# Patient Record
Sex: MALE | Race: WHITE | Employment: OTHER | URBAN - NONMETROPOLITAN AREA
[De-identification: names, ages, dates, MRNs, and addresses within clinical notes are randomized per-mention and may not be internally consistent; named-entity substitution may affect disease eponyms.]

---

## 2017-03-01 PROBLEM — S06.5XAA SUBDURAL HEMATOMA: Status: ACTIVE | Noted: 2017-03-01

## 2017-03-01 PROBLEM — I60.9 SUBARACHNOID HEMORRHAGE (HCC): Status: ACTIVE | Noted: 2017-03-01

## 2017-11-27 PROBLEM — R10.11 ABDOMINAL PAIN, RIGHT UPPER QUADRANT: Status: ACTIVE | Noted: 2017-11-27

## 2017-11-27 PROBLEM — R07.1 CHEST PAIN ON BREATHING: Status: ACTIVE | Noted: 2017-11-27

## 2018-03-20 ENCOUNTER — OFFICE VISIT (OUTPATIENT)
Dept: ORTHOPEDIC SURGERY | Age: 63
End: 2018-03-20
Payer: MEDICARE

## 2018-03-20 VITALS
TEMPERATURE: 97.8 F | WEIGHT: 250 LBS | BODY MASS INDEX: 33.86 KG/M2 | SYSTOLIC BLOOD PRESSURE: 131 MMHG | HEIGHT: 72 IN | DIASTOLIC BLOOD PRESSURE: 86 MMHG | HEART RATE: 64 BPM

## 2018-03-20 DIAGNOSIS — M70.61 TROCHANTERIC BURSITIS OF RIGHT HIP: ICD-10-CM

## 2018-03-20 DIAGNOSIS — M25.551 RIGHT HIP PAIN: Primary | ICD-10-CM

## 2018-03-20 PROCEDURE — 99213 OFFICE O/P EST LOW 20 MIN: CPT | Performed by: ORTHOPAEDIC SURGERY

## 2018-03-20 RX ORDER — LEVOTHYROXINE SODIUM 175 UG/1
TABLET ORAL
Refills: 0 | COMMUNITY
Start: 2018-03-07 | End: 2019-07-08

## 2018-03-20 RX ORDER — CITALOPRAM 20 MG/1
TABLET ORAL
Refills: 0 | COMMUNITY
Start: 2018-03-18 | End: 2018-03-20

## 2018-05-15 ENCOUNTER — OFFICE VISIT (OUTPATIENT)
Dept: ORTHOPEDIC SURGERY | Age: 63
End: 2018-05-15
Payer: MEDICARE

## 2018-05-15 VITALS
WEIGHT: 250 LBS | BODY MASS INDEX: 33.86 KG/M2 | SYSTOLIC BLOOD PRESSURE: 131 MMHG | HEIGHT: 72 IN | DIASTOLIC BLOOD PRESSURE: 77 MMHG | HEART RATE: 71 BPM | TEMPERATURE: 97.5 F

## 2018-05-15 DIAGNOSIS — M25.551 RIGHT HIP PAIN: ICD-10-CM

## 2018-05-15 DIAGNOSIS — M70.61 TROCHANTERIC BURSITIS OF RIGHT HIP: Primary | ICD-10-CM

## 2018-05-15 PROCEDURE — 20610 DRAIN/INJ JOINT/BURSA W/O US: CPT | Performed by: ORTHOPAEDIC SURGERY

## 2018-05-15 PROCEDURE — 99213 OFFICE O/P EST LOW 20 MIN: CPT | Performed by: ORTHOPAEDIC SURGERY

## 2018-05-15 RX ORDER — TAMSULOSIN HYDROCHLORIDE 0.4 MG/1
0.4 CAPSULE ORAL NIGHTLY
Refills: 1 | COMMUNITY
Start: 2018-04-12

## 2018-05-15 RX ORDER — TRIAMCINOLONE ACETONIDE 40 MG/ML
40 INJECTION, SUSPENSION INTRA-ARTICULAR; INTRAMUSCULAR ONCE
Status: COMPLETED | OUTPATIENT
Start: 2018-05-15 | End: 2018-05-15

## 2018-05-15 RX ORDER — LIDOCAINE HYDROCHLORIDE 10 MG/ML
2 INJECTION, SOLUTION INFILTRATION; PERINEURAL ONCE
Status: COMPLETED | OUTPATIENT
Start: 2018-05-15 | End: 2018-05-15

## 2018-05-15 RX ORDER — BUPIVACAINE HYDROCHLORIDE 2.5 MG/ML
2 INJECTION, SOLUTION INFILTRATION; PERINEURAL ONCE
Status: COMPLETED | OUTPATIENT
Start: 2018-05-15 | End: 2018-05-15

## 2018-05-15 RX ORDER — CALCIUM CARBONATE 500(1250)
TABLET ORAL
Refills: 0 | COMMUNITY
Start: 2018-05-04 | End: 2020-01-08

## 2018-05-15 RX ADMIN — LIDOCAINE HYDROCHLORIDE 2 ML: 10 INJECTION, SOLUTION INFILTRATION; PERINEURAL at 10:21

## 2018-05-15 RX ADMIN — TRIAMCINOLONE ACETONIDE 40 MG: 40 INJECTION, SUSPENSION INTRA-ARTICULAR; INTRAMUSCULAR at 10:22

## 2018-05-15 RX ADMIN — BUPIVACAINE HYDROCHLORIDE 5 MG: 2.5 INJECTION, SOLUTION INFILTRATION; PERINEURAL at 10:21

## 2018-07-31 ENCOUNTER — OFFICE VISIT (OUTPATIENT)
Dept: ORTHOPEDIC SURGERY | Age: 63
End: 2018-07-31
Payer: MEDICARE

## 2018-07-31 VITALS
HEIGHT: 72 IN | BODY MASS INDEX: 33.86 KG/M2 | SYSTOLIC BLOOD PRESSURE: 137 MMHG | DIASTOLIC BLOOD PRESSURE: 84 MMHG | HEART RATE: 78 BPM | WEIGHT: 250 LBS

## 2018-07-31 DIAGNOSIS — M25.562 LEFT KNEE PAIN, UNSPECIFIED CHRONICITY: Primary | ICD-10-CM

## 2018-07-31 PROCEDURE — 99213 OFFICE O/P EST LOW 20 MIN: CPT | Performed by: ORTHOPAEDIC SURGERY

## 2018-07-31 PROCEDURE — 20610 DRAIN/INJ JOINT/BURSA W/O US: CPT | Performed by: ORTHOPAEDIC SURGERY

## 2018-07-31 RX ORDER — LIDOCAINE HYDROCHLORIDE 10 MG/ML
2 INJECTION, SOLUTION INFILTRATION; PERINEURAL ONCE
Status: COMPLETED | OUTPATIENT
Start: 2018-07-31 | End: 2018-07-31

## 2018-07-31 RX ORDER — TRIAMCINOLONE ACETONIDE 40 MG/ML
40 INJECTION, SUSPENSION INTRA-ARTICULAR; INTRAMUSCULAR ONCE
Status: COMPLETED | OUTPATIENT
Start: 2018-07-31 | End: 2018-07-31

## 2018-07-31 RX ORDER — BUPIVACAINE HYDROCHLORIDE 2.5 MG/ML
2 INJECTION, SOLUTION INFILTRATION; PERINEURAL ONCE
Status: COMPLETED | OUTPATIENT
Start: 2018-07-31 | End: 2018-07-31

## 2018-07-31 RX ADMIN — TRIAMCINOLONE ACETONIDE 40 MG: 40 INJECTION, SUSPENSION INTRA-ARTICULAR; INTRAMUSCULAR at 16:02

## 2018-07-31 RX ADMIN — BUPIVACAINE HYDROCHLORIDE 5 MG: 2.5 INJECTION, SOLUTION INFILTRATION; PERINEURAL at 16:01

## 2018-07-31 RX ADMIN — LIDOCAINE HYDROCHLORIDE 2 ML: 10 INJECTION, SOLUTION INFILTRATION; PERINEURAL at 16:02

## 2018-07-31 NOTE — PROGRESS NOTES
Assisted Dr. Dk Kang with injection of 2 cc marcaine/2 cc lidocaine/1 cc kenalog in L knee. Patient tolerated procedure well. Verbal instructions were given.

## 2018-07-31 NOTE — PROGRESS NOTES
Established Patient: New Knee Patient     Referring Provider:   No referring provider defined for this encounter. CHIEF COMPLAINT:   Chief Complaint   Patient presents with    Knee Pain     Left knee pain        HPI:    Jeffy Stewart is a 66y.o. year old male who is seen today  for evaluation of left knee pain. He is well known to us from previous assessment of his hip bursitis. This has responded to injections. His left knee has started bothering him as he is been doing more therapy including standing and doing some walking with weights. The pain is mainly medial.  He has history of meniscectomy open many years ago. He has had no treatment. PAST MEDICAL HISTORY  Past Medical History:   Diagnosis Date    Bleeding tendency ([address]     Hypertension     Left knee pain 7/31/2018    Pain of right side of body     RADIATES TO RT LEG    Thyroid disease     Tingling     SOMRTIMES RT HAND & LEG    Traumatic brain injury ([facility] [address] 2008    GAS WELL EXPLOSION WITH  TRAUMATIC BRAIN INJURY AND RIGHTHEMIPARESIS     Urination decrease     Weakness of right side of body        PAST SURGICAL HISTORY  Past Surgical History:   Procedure Laterality Date    BACLOFEN PUMP IMPLANTATION  07/2015    BRAIN SURGERY       SHUNT JULY 2008    FRACTURE SURGERY      RIGHT ARM WITH WINTER PLACEMENT    HIP SURGERY      HETEROTOPIC OSSIFICATION RIGHT HIP    JOINT REPLACEMENT      RIGHT KNEE ARTHROSCOPIC    KIDNEY STONE SURGERY      PARTIAL NEPHRECTOMY      VENTRICULOPERITONEAL SHUNT  2008         FAMILY HISTORY   Family History   Problem Relation Age of Onset    No Known Problems Mother     Other Father        SOCIAL HISTORY  Social History     Social History    Marital status:      Spouse name: N/A    Number of children: N/A    Years of education: N/A     Occupational History    Not on file.      Social History Main Topics    Smoking status: Never Smoker    Smokeless tobacco: Never Used    Alcohol use No

## 2018-09-18 ENCOUNTER — OFFICE VISIT (OUTPATIENT)
Dept: ORTHOPEDIC SURGERY | Age: 63
End: 2018-09-18
Payer: MEDICARE

## 2018-09-18 VITALS
HEIGHT: 72 IN | WEIGHT: 250 LBS | DIASTOLIC BLOOD PRESSURE: 84 MMHG | BODY MASS INDEX: 33.86 KG/M2 | HEART RATE: 75 BPM | SYSTOLIC BLOOD PRESSURE: 132 MMHG

## 2018-09-18 DIAGNOSIS — M17.12 OSTEOARTHRITIS OF LEFT KNEE, UNSPECIFIED OSTEOARTHRITIS TYPE: ICD-10-CM

## 2018-09-18 DIAGNOSIS — M25.562 LEFT KNEE PAIN, UNSPECIFIED CHRONICITY: Primary | ICD-10-CM

## 2018-09-18 PROCEDURE — 99213 OFFICE O/P EST LOW 20 MIN: CPT | Performed by: ORTHOPAEDIC SURGERY

## 2019-01-08 LAB
CHOLESTEROL, TOTAL: 152 MG/DL
CHOLESTEROL/HDL RATIO: 7.2
HDLC SERPL-MCNC: 21 MG/DL (ref 35–70)
LDL CHOLESTEROL CALCULATED: 95 MG/DL (ref 0–160)
TRIGL SERPL-MCNC: 240 MG/DL
VLDLC SERPL CALC-MCNC: ABNORMAL MG/DL

## 2019-05-08 RX ORDER — TRIAMCINOLONE ACETONIDE 1 MG/G
CREAM TOPICAL 2 TIMES DAILY
COMMUNITY
End: 2019-07-08 | Stop reason: SDUPTHER

## 2019-05-09 ENCOUNTER — OFFICE VISIT (OUTPATIENT)
Dept: FAMILY MEDICINE CLINIC | Age: 64
End: 2019-05-09
Payer: COMMERCIAL

## 2019-05-09 VITALS — RESPIRATION RATE: 18 BRPM | BODY MASS INDEX: 33.91 KG/M2 | HEIGHT: 72 IN

## 2019-05-09 DIAGNOSIS — S06.2X9A: ICD-10-CM

## 2019-05-09 DIAGNOSIS — G81.91 RIGHT HEMIPARESIS (HCC): Primary | ICD-10-CM

## 2019-05-09 DIAGNOSIS — G91.3 POST-TRAUMATIC HYDROCEPHALUS (HCC): ICD-10-CM

## 2019-05-09 DIAGNOSIS — R60.0 LOCALIZED EDEMA: ICD-10-CM

## 2019-05-09 PROCEDURE — 99213 OFFICE O/P EST LOW 20 MIN: CPT | Performed by: FAMILY MEDICINE

## 2019-05-09 ASSESSMENT — PATIENT HEALTH QUESTIONNAIRE - PHQ9
SUM OF ALL RESPONSES TO PHQ9 QUESTIONS 1 & 2: 0
2. FEELING DOWN, DEPRESSED OR HOPELESS: 0
1. LITTLE INTEREST OR PLEASURE IN DOING THINGS: 0
SUM OF ALL RESPONSES TO PHQ QUESTIONS 1-9: 0
SUM OF ALL RESPONSES TO PHQ QUESTIONS 1-9: 0

## 2019-05-09 ASSESSMENT — ENCOUNTER SYMPTOMS
EYES NEGATIVE: 1
ALLERGIC/IMMUNOLOGIC NEGATIVE: 1
GASTROINTESTINAL NEGATIVE: 1
RESPIRATORY NEGATIVE: 1

## 2019-05-09 NOTE — PROGRESS NOTES
19  [facility] 1955 Sex: male  Age: 60 y.o. Chief Complaint   Patient presents with    Other     f/u on rt hemiplegia --       Follow up on [facility] claim. Pt continues to benefit from PT/OT/ speech therapy. Pt continues to have slurred speech. He can walk short distances with a walker/cane. Review of Systems   Constitutional: Negative. HENT: Negative. Eyes: Negative. Respiratory: Negative. Cardiovascular: Negative. Gastrointestinal: Negative. Endocrine: Negative. Genitourinary: Negative. Musculoskeletal: Negative. Pt using motorized wheelchair    Skin: Negative. Allergic/Immunologic: Negative. Neurological: Positive for speech difficulty. Hematological: Negative. Psychiatric/Behavioral: Negative. Current Outpatient Medications:     triamcinolone (KENALOG) 0.1 % cream, Apply topically 2 times daily, Disp: , Rfl:     MAGNESIUM OXIDE PO, Take by mouth, Disp: , Rfl:     tamsulosin (FLOMAX) 0.4 MG capsule, take 1 capsule by mouth once daily, Disp: , Rfl: 1    calcium elemental (OSCAL) 500 MG TABS tablet, , Disp: , Rfl: 0    levothyroxine (SYNTHROID) 175 MCG tablet, take 1 tablet by mouth once daily, Disp: , Rfl: 0    allopurinol (ZYLOPRIM) 100 MG tablet, , Disp: , Rfl: 0    Calcium Carbonate-Vitamin D (OYSTER SHELL CALCIUM/D) 500-200 MG-UNIT TABS, Take 1 tablet by mouth daily, Disp: , Rfl:     Misc Natural Products (OSTEO BI-FLEX JOINT SHIELD PO), Take by mouth, Disp: , Rfl:     potassium citrate (UROCIT-K) 10 MEQ (1080 MG) extended release tablet, , Disp: , Rfl: 1    BACLOFEN, PAIN PUMP REFILL CHARGE,, , Disp: , Rfl:     gabapentin (NEURONTIN) 100 MG capsule, Take 100 mg by mouth 2 times daily, Disp: , Rfl:     amLODIPine (NORVASC) 5 MG tablet, Take 5 mg by mouth daily, Disp: , Rfl:     citalopram (CELEXA) 10 MG tablet, Take 10 mg by mouth daily. , Disp: , Rfl:     aspirin 81 MG tablet, Take 81 mg by mouth daily.   , Disp: , Rfl:     therapeutic multivitamin-minerals (THERAGRAN-M) tablet, Take 1 tablet by mouth daily , Disp: , Rfl:   Allergies   Allergen Reactions    Septra [Bactrim]      SEPTRA DS REDNESS HIVES AND HARD TIME BREATHING    Sulfamethoxazole-Trimethoprim Rash       Past Medical History:   Diagnosis Date    Bleeding tendency ([facility] [address]     Diabetes mellitus ([facility] [address]     DVT (deep venous thrombosis) (HCC)     subclavian vein    Gout     Hydrocephalus     Hypertension     Hypothyroidism     Intracranial bleed ([facility] [address]     Left knee pain 7/31/2018    Pain of right side of body     RADIATES TO RT LEG    Tingling     SOMRTIMES RT HAND & LEG    Traumatic brain injury ([facility] [address] 2008    GAS WELL EXPLOSION WITH  TRAUMATIC BRAIN INJURY AND RIGHTHEMIPARESIS     Urination decrease     Visual disturbances     Weakness of right side of body      Past Surgical History:   Procedure Laterality Date    BACLOFEN PUMP IMPLANTATION  07/2015    BRAIN SURGERY       SHUNT JULY 2008    FRACTURE SURGERY      RIGHT ARM WITH WINTER PLACEMENT    HIP SURGERY      HETEROTOPIC OSSIFICATION RIGHT HIP    JOINT REPLACEMENT      RIGHT KNEE ARTHROSCOPIC    KIDNEY STONE SURGERY      PARTIAL NEPHRECTOMY      VENTRICULOPERITONEAL SHUNT  2008     Family History   Problem Relation Age of Onset    COPD Mother     Diabetes Mother     Other Father         Black lung    Cancer Brother         Lung, Stomach     Social History     Socioeconomic History    Marital status:      Spouse name: Not on file    Number of children: Not on file    Years of education: Not on file    Highest education level: Not on file   Occupational History    Not on file   Social Needs    Financial resource strain: Not on file    Food insecurity:     Worry: Not on file     Inability: Not on file    Transportation needs:     Medical: Not on file     Non-medical: Not on file   Tobacco Use    Smoking status: Never Smoker    Smokeless tobacco: Never Used   Substance and and Plan:  Romi Byrnes was seen today for other.     Diagnoses and all orders for this visit:    Right hemiparesis ([facility] [address]  Comments:  refilled pt/ot/speech therapy today   Orders:  -     Cancel: External Referral To Speech Therapy  -     Cancel: External Referral To Occupational Therapy  -     Cancel: External Referral To Physical Therapy  -     Cancel: External Referral To Physical Therapy  -     Cancel: External Referral To Occupational Therapy  -     Cancel: External Referral To Speech Therapy  -     External Referral To Physical Therapy  -     Amb External Referral To Speech Therapy  -     Amb External Referral To Occupational Therapy    Post-traumatic hydrocephalus  -     Cancel: External Referral To Speech Therapy  -     Cancel: External Referral To Occupational Therapy  -     Cancel: External Referral To Physical Therapy  -     Cancel: External Referral To Physical Therapy  -     Cancel: External Referral To Occupational Therapy  -     Cancel: External Referral To Speech Therapy  -     External Referral To Physical Therapy  -     Amb External Referral To Speech Therapy  -     Amb External Referral To Occupational Therapy    Cortex contusion without open intracranial wound and with prolonged loss of consciousness (more than 24 hours) without return to pre-existing conscious level, initial encounter ([facility] [address]  -     Cancel: External Referral To Speech Therapy  -     Cancel: External Referral To Occupational Therapy  -     Cancel: External Referral To Physical Therapy  -     Cancel: External Referral To Physical Therapy  -     Cancel: External Referral To Occupational Therapy  -     Cancel: External Referral To Speech Therapy  -     External Referral To Physical Therapy  -     Amb External Referral To Speech Therapy  -     Amb External Referral To Occupational Therapy    Localized edema  -     Cancel: External Referral To Speech Therapy  -     Cancel: External Referral To Occupational Therapy  -     Cancel: External Referral To Physical Therapy  -     Cancel: External Referral To Physical Therapy  -     Cancel: External Referral To Occupational Therapy  -     Cancel: External Referral To Speech Therapy  -     External Referral To Physical Therapy  -     Amb External Referral To Speech Therapy  -     Amb External Referral To Occupational Therapy        Return in about 3 months (around 8/9/2019). Seen By:  Steffi Parra DO  This note has been transcribed by Derik Durbin under the direction of Dr. Zari Koenig. Dr. Pili Salas has reviewed this note for accuracy. I, Dr. Pili Salas, personally performed the services described in this documentation as scribed by Derik Durbin in my presence, and it is both accurate and complete.

## 2019-05-10 ENCOUNTER — TELEPHONE (OUTPATIENT)
Dept: FAMILY MEDICINE CLINIC | Age: 64
End: 2019-05-10

## 2019-05-23 ENCOUNTER — TELEPHONE (OUTPATIENT)
Dept: FAMILY MEDICINE CLINIC | Age: 64
End: 2019-05-23

## 2019-07-08 ENCOUNTER — OFFICE VISIT (OUTPATIENT)
Dept: FAMILY MEDICINE CLINIC | Age: 64
End: 2019-07-08
Payer: MEDICARE

## 2019-07-08 ENCOUNTER — HOSPITAL ENCOUNTER (OUTPATIENT)
Age: 64
Discharge: HOME | End: 2019-07-10
Payer: MEDICARE

## 2019-07-08 VITALS — HEART RATE: 67 BPM | RESPIRATION RATE: 16 BRPM | SYSTOLIC BLOOD PRESSURE: 116 MMHG | DIASTOLIC BLOOD PRESSURE: 66 MMHG

## 2019-07-08 DIAGNOSIS — E03.9 HYPOTHYROIDISM, UNSPECIFIED TYPE: ICD-10-CM

## 2019-07-08 DIAGNOSIS — F32.A DEPRESSION, UNSPECIFIED DEPRESSION TYPE: ICD-10-CM

## 2019-07-08 DIAGNOSIS — R94.5 ABNORMAL LIVER FUNCTION: ICD-10-CM

## 2019-07-08 DIAGNOSIS — I10 ESSENTIAL HYPERTENSION: Primary | ICD-10-CM

## 2019-07-08 DIAGNOSIS — M10.9 GOUT, UNSPECIFIED CAUSE, UNSPECIFIED CHRONICITY, UNSPECIFIED SITE: ICD-10-CM

## 2019-07-08 LAB
T4 FREE: 1.84 NG/DL (ref 0.93–1.7)
TSH SERPL DL<=0.05 MIU/L-ACNC: 0.63 UIU/ML (ref 0.27–4.2)
URIC ACID, SERUM: 7 MG/DL (ref 3.4–7)

## 2019-07-08 PROCEDURE — 84443 ASSAY THYROID STIM HORMONE: CPT

## 2019-07-08 PROCEDURE — 99214 OFFICE O/P EST MOD 30 MIN: CPT | Performed by: FAMILY MEDICINE

## 2019-07-08 PROCEDURE — 36415 COLL VENOUS BLD VENIPUNCTURE: CPT

## 2019-07-08 PROCEDURE — 93000 ELECTROCARDIOGRAM COMPLETE: CPT | Performed by: FAMILY MEDICINE

## 2019-07-08 PROCEDURE — 84550 ASSAY OF BLOOD/URIC ACID: CPT

## 2019-07-08 PROCEDURE — 84439 ASSAY OF FREE THYROXINE: CPT

## 2019-07-08 RX ORDER — TRIAMCINOLONE ACETONIDE 1 MG/G
CREAM TOPICAL 2 TIMES DAILY
Qty: 80 G | Refills: 3 | Status: SHIPPED | OUTPATIENT
Start: 2019-07-08 | End: 2020-01-08 | Stop reason: SDUPTHER

## 2019-07-08 RX ORDER — LEVOTHYROXINE SODIUM 0.15 MG/1
TABLET ORAL
Refills: 0 | COMMUNITY
Start: 2019-06-26 | End: 2019-07-08 | Stop reason: SDUPTHER

## 2019-07-08 RX ORDER — CITALOPRAM 10 MG/1
10 TABLET ORAL DAILY
Qty: 90 TABLET | Refills: 1 | Status: SHIPPED | OUTPATIENT
Start: 2019-07-08 | End: 2019-10-21

## 2019-07-08 RX ORDER — AMLODIPINE BESYLATE 5 MG/1
5 TABLET ORAL DAILY
Qty: 90 TABLET | Refills: 1 | Status: SHIPPED | OUTPATIENT
Start: 2019-07-08 | End: 2020-01-08 | Stop reason: SDUPTHER

## 2019-07-08 RX ORDER — LEVOTHYROXINE SODIUM 0.15 MG/1
150 TABLET ORAL DAILY
Qty: 90 TABLET | Refills: 1 | Status: SHIPPED | OUTPATIENT
Start: 2019-07-08 | End: 2020-01-08 | Stop reason: SDUPTHER

## 2019-07-08 ASSESSMENT — ENCOUNTER SYMPTOMS
CHEST TIGHTNESS: 0
ABDOMINAL PAIN: 0
SHORTNESS OF BREATH: 0
ALLERGIC/IMMUNOLOGIC NEGATIVE: 1

## 2019-07-24 RX ORDER — CITALOPRAM 20 MG/1
20 TABLET ORAL DAILY
Qty: 90 TABLET | Refills: 1 | Status: SHIPPED | OUTPATIENT
Start: 2019-07-24 | End: 2020-01-08 | Stop reason: SDUPTHER

## 2019-08-05 ASSESSMENT — ENCOUNTER SYMPTOMS
CHEST TIGHTNESS: 0
ABDOMINAL PAIN: 0
ALLERGIC/IMMUNOLOGIC NEGATIVE: 1
SHORTNESS OF BREATH: 0

## 2019-08-05 NOTE — PROGRESS NOTES
Spouse name: Not on file    Number of children: Not on file    Years of education: Not on file    Highest education level: Not on file   Occupational History    Not on file   Social Needs    Financial resource strain: Not on file    Food insecurity:     Worry: Not on file     Inability: Not on file    Transportation needs:     Medical: Not on file     Non-medical: Not on file   Tobacco Use    Smoking status: Never Smoker    Smokeless tobacco: Never Used   Substance and Sexual Activity    Alcohol use: No    Drug use: No    Sexual activity: Not on file   Lifestyle    Physical activity:     Days per week: Not on file     Minutes per session: Not on file    Stress: Not on file   Relationships    Social connections:     Talks on phone: Not on file     Gets together: Not on file     Attends Gnosticism service: Not on file     Active member of club or organization: Not on file     Attends meetings of clubs or organizations: Not on file     Relationship status: Not on file    Intimate partner violence:     Fear of current or ex partner: Not on file     Emotionally abused: Not on file     Physically abused: Not on file     Forced sexual activity: Not on file   Other Topics Concern    Not on file   Social History Narrative    Not on file       Vitals:    08/06/19 1159   BP: 124/66   Pulse: 64   Resp: 18   Temp: 98.3 °F (36.8 °C)   TempSrc: Temporal   SpO2: 96%       Physical Exam   Constitutional: He is oriented to person, place, and time. He appears well-developed and well-nourished. Neck: No thyromegaly present. Cardiovascular: Normal rate, regular rhythm, normal heart sounds and intact distal pulses. Pulmonary/Chest: Effort normal and breath sounds normal.   Abdominal: Soft. Bowel sounds are normal. He exhibits no mass. There is no tenderness. Musculoskeletal: Normal range of motion. MINIMAL USE RT ARM  AND RT LEG   Lymphadenopathy:     He has no cervical adenopathy.    Neurological: He is alert

## 2019-08-06 ENCOUNTER — OFFICE VISIT (OUTPATIENT)
Dept: FAMILY MEDICINE CLINIC | Age: 64
End: 2019-08-06
Payer: COMMERCIAL

## 2019-08-06 VITALS
OXYGEN SATURATION: 96 % | SYSTOLIC BLOOD PRESSURE: 124 MMHG | HEART RATE: 64 BPM | RESPIRATION RATE: 18 BRPM | TEMPERATURE: 98.3 F | DIASTOLIC BLOOD PRESSURE: 66 MMHG

## 2019-08-06 DIAGNOSIS — R60.0 LOCALIZED EDEMA: ICD-10-CM

## 2019-08-06 DIAGNOSIS — G81.91 RIGHT HEMIPARESIS (HCC): Primary | ICD-10-CM

## 2019-08-06 DIAGNOSIS — G91.3 POST-TRAUMATIC HYDROCEPHALUS (HCC): ICD-10-CM

## 2019-08-06 DIAGNOSIS — S06.2X9A: ICD-10-CM

## 2019-08-06 PROCEDURE — 99213 OFFICE O/P EST LOW 20 MIN: CPT | Performed by: FAMILY MEDICINE

## 2019-10-17 ASSESSMENT — ENCOUNTER SYMPTOMS
EYES NEGATIVE: 1
BLOOD IN STOOL: 0
ABDOMINAL PAIN: 0
CHEST TIGHTNESS: 0
SHORTNESS OF BREATH: 0

## 2019-10-21 ENCOUNTER — NURSE ONLY (OUTPATIENT)
Dept: FAMILY MEDICINE CLINIC | Age: 64
End: 2019-10-21
Payer: MEDICARE

## 2019-10-21 ENCOUNTER — OFFICE VISIT (OUTPATIENT)
Dept: FAMILY MEDICINE CLINIC | Age: 64
End: 2019-10-21
Payer: COMMERCIAL

## 2019-10-21 VITALS
DIASTOLIC BLOOD PRESSURE: 56 MMHG | SYSTOLIC BLOOD PRESSURE: 110 MMHG | HEART RATE: 76 BPM | OXYGEN SATURATION: 97 % | TEMPERATURE: 97.7 F | RESPIRATION RATE: 18 BRPM

## 2019-10-21 DIAGNOSIS — G81.91 RIGHT HEMIPARESIS (HCC): ICD-10-CM

## 2019-10-21 DIAGNOSIS — S06.2X9A: ICD-10-CM

## 2019-10-21 DIAGNOSIS — Z23 NEED FOR INFLUENZA VACCINATION: Primary | ICD-10-CM

## 2019-10-21 DIAGNOSIS — G91.3 POST-TRAUMATIC HYDROCEPHALUS (HCC): ICD-10-CM

## 2019-10-21 PROCEDURE — 90686 IIV4 VACC NO PRSV 0.5 ML IM: CPT | Performed by: FAMILY MEDICINE

## 2019-10-21 PROCEDURE — 99213 OFFICE O/P EST LOW 20 MIN: CPT | Performed by: FAMILY MEDICINE

## 2019-10-21 PROCEDURE — G0008 ADMIN INFLUENZA VIRUS VAC: HCPCS | Performed by: FAMILY MEDICINE

## 2020-01-07 PROBLEM — R10.11 ABDOMINAL PAIN, RIGHT UPPER QUADRANT: Status: RESOLVED | Noted: 2017-11-27 | Resolved: 2020-01-07

## 2020-01-07 PROBLEM — R07.1 CHEST PAIN ON BREATHING: Status: RESOLVED | Noted: 2017-11-27 | Resolved: 2020-01-07

## 2020-01-07 ASSESSMENT — ENCOUNTER SYMPTOMS
SHORTNESS OF BREATH: 0
BLOOD IN STOOL: 0
CHEST TIGHTNESS: 0
ABDOMINAL PAIN: 0

## 2020-01-07 NOTE — PROGRESS NOTES
20  Sun Ellington : 1955 Sex: male  Age: 60 y.o. Chief Complaint   Patient presents with    Hypertension     HPI:  Patient presents for recheck of hypothyroid, hypertension, gout and depression. Patient due for labs. Over the holiday, patient states he did not feel good and he had passed a kidney stone. Patient has appointment with Urology coming up as well. Patient follows Nephrology. Denies chest pain, edema, fatigue, palpitations and syncope. Compliance reviewed. No medication side effects noted. Review of Systems   Constitutional: Negative for appetite change and unexpected weight change. HENT: Negative for congestion and ear pain. Eyes: Negative for visual disturbance. Respiratory: Negative for chest tightness and shortness of breath. Cardiovascular: Negative for chest pain and leg swelling. Gastrointestinal: Negative for abdominal pain and blood in stool. Endocrine: Negative for cold intolerance and heat intolerance. Genitourinary: Negative for difficulty urinating. Musculoskeletal: Negative for arthralgias. Skin: Negative for rash. Neurological: Negative for dizziness and light-headedness. Hematological: Negative for adenopathy. Psychiatric/Behavioral: Negative for suicidal ideas. The patient is not nervous/anxious.           Current Outpatient Medications:     amLODIPine (NORVASC) 5 MG tablet, Take 1 tablet by mouth daily, Disp: 90 tablet, Rfl: 1    citalopram (CELEXA) 20 MG tablet, Take 1 tablet by mouth daily, Disp: 90 tablet, Rfl: 1    levothyroxine (SYNTHROID) 150 MCG tablet, Take 1 tablet by mouth Daily, Disp: 90 tablet, Rfl: 1    triamcinolone (KENALOG) 0.1 % cream, Apply topically 2 times daily, Disp: 80 g, Rfl: 3    cycloSPORINE (RESTASIS) 0.05 % ophthalmic emulsion, Place 1 drop into both eyes 2 times daily, Disp: , Rfl:     allopurinol (ZYLOPRIM) 100 MG tablet, Take 1 tablet by mouth daily, Disp: 90 tablet, Rfl: 1   normal.      Breath sounds: Normal breath sounds. Abdominal:      General: Bowel sounds are normal.      Palpations: Abdomen is soft. There is no mass. Tenderness: There is no tenderness. Musculoskeletal: Normal range of motion. Comments: Rt hemiparesis--weakness rt upper and lower extremities      Lymphadenopathy:      Cervical: No cervical adenopathy. Skin:     General: Skin is warm and dry. Findings: No rash. Neurological:      Mental Status: He is alert and oriented to person, place, and time. Psychiatric:         Mood and Affect: Mood normal.               Assessment and Plan:  Janice Javed was seen today for hypertension. Diagnoses and all orders for this visit:    Hypothyroidism, unspecified type  Comments:  -due for labs today  Orders:  -     levothyroxine (SYNTHROID) 150 MCG tablet; Take 1 tablet by mouth Daily    Depression, unspecified depression type  Comments:  -mood doing well on Celexa  Orders:  -     citalopram (CELEXA) 20 MG tablet; Take 1 tablet by mouth daily    Right hemiparesis (HCC)  Comments:  -no change, he continues to use wheelchair  Orders:  -     triamcinolone (KENALOG) 0.1 % cream; Apply topically 2 times daily    Essential hypertension  Comments:  stable   Orders:  -     amLODIPine (NORVASC) 5 MG tablet; Take 1 tablet by mouth daily  -     Cancel: CBC; Future  -     Cancel: Comprehensive Metabolic Panel; Future    Gout, unspecified cause, unspecified chronicity, unspecified site  Comments:  recheck labs today - no flare ups  Orders:  -     allopurinol (ZYLOPRIM) 100 MG tablet; Take 1 tablet by mouth daily    Post-traumatic hydrocephalus (HCC)  Comments:  -stable              Return in about 6 months (around 7/8/2020). Seen By:  Mami Vaughan, DO        This note has been transcribed by Dorys Garcia under the direction of Dr. Isak Harvey.     I, Dr. Renee Biggs, personally performed the services described in this documentation as scribed by Dorys Garcia in my presence, and it is both accurate and complete.

## 2020-01-08 ENCOUNTER — OFFICE VISIT (OUTPATIENT)
Dept: FAMILY MEDICINE CLINIC | Age: 65
End: 2020-01-08
Payer: MEDICARE

## 2020-01-08 ENCOUNTER — HOSPITAL ENCOUNTER (OUTPATIENT)
Age: 65
Discharge: HOME | End: 2020-01-10
Payer: MEDICARE

## 2020-01-08 VITALS
SYSTOLIC BLOOD PRESSURE: 126 MMHG | DIASTOLIC BLOOD PRESSURE: 68 MMHG | HEART RATE: 79 BPM | RESPIRATION RATE: 16 BRPM | TEMPERATURE: 97.9 F | OXYGEN SATURATION: 97 %

## 2020-01-08 PROBLEM — I10 ESSENTIAL HYPERTENSION: Status: ACTIVE | Noted: 2020-01-08

## 2020-01-08 PROBLEM — M10.9 GOUT: Status: ACTIVE | Noted: 2020-01-08

## 2020-01-08 PROBLEM — G91.3 POST-TRAUMATIC HYDROCEPHALUS (HCC): Status: ACTIVE | Noted: 2020-01-08

## 2020-01-08 PROBLEM — M25.562 LEFT KNEE PAIN: Status: RESOLVED | Noted: 2018-07-31 | Resolved: 2020-01-08

## 2020-01-08 PROBLEM — F32.A DEPRESSION: Status: ACTIVE | Noted: 2020-01-08

## 2020-01-08 PROBLEM — G81.91 RIGHT HEMIPARESIS (HCC): Status: ACTIVE | Noted: 2020-01-08

## 2020-01-08 PROBLEM — E03.9 HYPOTHYROIDISM: Status: ACTIVE | Noted: 2020-01-08

## 2020-01-08 LAB
T4 FREE: 1.53 NG/DL (ref 0.93–1.7)
TSH SERPL DL<=0.05 MIU/L-ACNC: 1.16 UIU/ML (ref 0.27–4.2)
URIC ACID, SERUM: 7.2 MG/DL (ref 3.4–7)

## 2020-01-08 PROCEDURE — 84550 ASSAY OF BLOOD/URIC ACID: CPT

## 2020-01-08 PROCEDURE — 84439 ASSAY OF FREE THYROXINE: CPT

## 2020-01-08 PROCEDURE — 84443 ASSAY THYROID STIM HORMONE: CPT

## 2020-01-08 PROCEDURE — 36415 COLL VENOUS BLD VENIPUNCTURE: CPT

## 2020-01-08 PROCEDURE — 99214 OFFICE O/P EST MOD 30 MIN: CPT | Performed by: FAMILY MEDICINE

## 2020-01-08 RX ORDER — TRIAMCINOLONE ACETONIDE 1 MG/G
CREAM TOPICAL 2 TIMES DAILY
Qty: 80 G | Refills: 3 | Status: SHIPPED | OUTPATIENT
Start: 2020-01-08 | End: 2021-04-09 | Stop reason: ALTCHOICE

## 2020-01-08 RX ORDER — CITALOPRAM 20 MG/1
20 TABLET ORAL DAILY
Qty: 90 TABLET | Refills: 1 | Status: SHIPPED
Start: 2020-01-08 | End: 2020-06-25 | Stop reason: SDUPTHER

## 2020-01-08 RX ORDER — AMLODIPINE BESYLATE 5 MG/1
5 TABLET ORAL DAILY
Qty: 90 TABLET | Refills: 1 | Status: SHIPPED
Start: 2020-01-08 | End: 2020-06-25 | Stop reason: SDUPTHER

## 2020-01-08 RX ORDER — ALLOPURINOL 100 MG/1
100 TABLET ORAL DAILY
Qty: 90 TABLET | Refills: 1 | Status: SHIPPED | OUTPATIENT
Start: 2020-01-08 | End: 2020-01-09 | Stop reason: SDUPTHER

## 2020-01-08 RX ORDER — CYCLOSPORINE 0.5 MG/ML
1 EMULSION OPHTHALMIC 2 TIMES DAILY
COMMUNITY
End: 2021-04-09 | Stop reason: ALTCHOICE

## 2020-01-08 RX ORDER — LEVOTHYROXINE SODIUM 0.15 MG/1
150 TABLET ORAL DAILY
Qty: 90 TABLET | Refills: 1 | Status: SHIPPED
Start: 2020-01-08 | End: 2020-06-25 | Stop reason: SDUPTHER

## 2020-01-08 ASSESSMENT — PATIENT HEALTH QUESTIONNAIRE - PHQ9
SUM OF ALL RESPONSES TO PHQ QUESTIONS 1-9: 0
SUM OF ALL RESPONSES TO PHQ9 QUESTIONS 1 & 2: 0
SUM OF ALL RESPONSES TO PHQ QUESTIONS 1-9: 0
2. FEELING DOWN, DEPRESSED OR HOPELESS: 0
1. LITTLE INTEREST OR PLEASURE IN DOING THINGS: 0

## 2020-01-09 ENCOUNTER — TELEPHONE (OUTPATIENT)
Dept: FAMILY MEDICINE CLINIC | Age: 65
End: 2020-01-09

## 2020-01-09 RX ORDER — ALLOPURINOL 100 MG/1
TABLET ORAL
Qty: 180 TABLET | Refills: 1 | Status: SHIPPED
Start: 2020-01-09 | End: 2020-06-25 | Stop reason: SDUPTHER

## 2020-01-09 NOTE — TELEPHONE ENCOUNTER
Uric acid level is too high --I raised his allopurinol to two tabs daily--sent to pharmacy--thyroid ok

## 2020-02-10 ENCOUNTER — TELEPHONE (OUTPATIENT)
Dept: FAMILY MEDICINE CLINIC | Age: 65
End: 2020-02-10

## 2020-02-10 ASSESSMENT — ENCOUNTER SYMPTOMS
BLOOD IN STOOL: 0
SHORTNESS OF BREATH: 0
CHEST TIGHTNESS: 0
ABDOMINAL PAIN: 0

## 2020-02-10 NOTE — PROGRESS NOTES
2/10/20  Bishnu Christianson : 1955 Sex: male  Age: 60 y.o. Chief Complaint   Patient presents with   Haylee Osborne Other     workers comp       Workers comp follow up. Patient continues to benefit from PT/OT/Speech therapy. He continues to have some slurred speech. Patient is able to walk short distances with a walker. Patient is here today in a wheelchair due to the long hallways. Denies chest pain, edema, fatigue, palpitations and syncope. Compliance reviewed. No medication side effects noted. Review of Systems   Constitutional: Negative for appetite change and unexpected weight change. HENT: Negative for congestion and ear pain. Eyes: Negative for visual disturbance. Respiratory: Negative for chest tightness and shortness of breath. Cardiovascular: Negative for chest pain and leg swelling. Gastrointestinal: Negative for abdominal pain and blood in stool. Endocrine: Negative for cold intolerance and heat intolerance. Genitourinary: Negative for difficulty urinating. Musculoskeletal: Positive for gait problem. Negative for arthralgias. Skin: Negative for rash. Neurological: Positive for speech difficulty. Negative for dizziness and light-headedness. Hematological: Negative for adenopathy. Psychiatric/Behavioral: Negative for suicidal ideas. The patient is not nervous/anxious.           Current Outpatient Medications:     allopurinol (ZYLOPRIM) 100 MG tablet, Take 2 tabs qd, Disp: 180 tablet, Rfl: 1    amLODIPine (NORVASC) 5 MG tablet, Take 1 tablet by mouth daily, Disp: 90 tablet, Rfl: 1    citalopram (CELEXA) 20 MG tablet, Take 1 tablet by mouth daily, Disp: 90 tablet, Rfl: 1    levothyroxine (SYNTHROID) 150 MCG tablet, Take 1 tablet by mouth Daily, Disp: 90 tablet, Rfl: 1    triamcinolone (KENALOG) 0.1 % cream, Apply topically 2 times daily, Disp: 80 g, Rfl: 3    cycloSPORINE (RESTASIS) 0.05 % ophthalmic emulsion, Place 1 drop into both eyes 2 times daily, Disp: ,  COPD Mother     Diabetes Mother     Other Father         Black lung    Cancer Brother         Lung, Stomach     Social History     Socioeconomic History    Marital status:      Spouse name: Not on file    Number of children: Not on file    Years of education: Not on file    Highest education level: Not on file   Occupational History    Not on file   Social Needs    Financial resource strain: Not on file    Food insecurity:     Worry: Not on file     Inability: Not on file    Transportation needs:     Medical: Not on file     Non-medical: Not on file   Tobacco Use    Smoking status: Never Smoker    Smokeless tobacco: Never Used   Substance and Sexual Activity    Alcohol use: No    Drug use: No    Sexual activity: Not on file   Lifestyle    Physical activity:     Days per week: Not on file     Minutes per session: Not on file    Stress: Not on file   Relationships    Social connections:     Talks on phone: Not on file     Gets together: Not on file     Attends Muslim service: Not on file     Active member of club or organization: Not on file     Attends meetings of clubs or organizations: Not on file     Relationship status: Not on file    Intimate partner violence:     Fear of current or ex partner: Not on file     Emotionally abused: Not on file     Physically abused: Not on file     Forced sexual activity: Not on file   Other Topics Concern    Not on file   Social History Narrative    Not on file       Vitals:    02/11/20 1103   BP: 116/60   Pulse: 77   Resp: 18   Temp: 97.6 °F (36.4 °C)   TempSrc: Temporal   SpO2: 97%   Weight: Comment: patient unable to stand at scale               Physical Exam  Constitutional:       Appearance: Normal appearance. He is well-developed.    HENT:      Right Ear: Tympanic membrane normal.      Left Ear: Tympanic membrane normal.      Nose: Nose normal.      Mouth/Throat:      Mouth: Mucous membranes are moist.   Eyes:      Extraocular Movements:

## 2020-02-11 ENCOUNTER — OFFICE VISIT (OUTPATIENT)
Dept: FAMILY MEDICINE CLINIC | Age: 65
End: 2020-02-11
Payer: COMMERCIAL

## 2020-02-11 VITALS
DIASTOLIC BLOOD PRESSURE: 60 MMHG | TEMPERATURE: 97.6 F | RESPIRATION RATE: 18 BRPM | HEART RATE: 77 BPM | OXYGEN SATURATION: 97 % | SYSTOLIC BLOOD PRESSURE: 116 MMHG

## 2020-02-11 PROCEDURE — 99213 OFFICE O/P EST LOW 20 MIN: CPT | Performed by: FAMILY MEDICINE

## 2020-03-02 ENCOUNTER — TELEPHONE (OUTPATIENT)
Dept: FAMILY MEDICINE CLINIC | Age: 65
End: 2020-03-02

## 2020-05-28 ENCOUNTER — OFFICE VISIT (OUTPATIENT)
Dept: FAMILY MEDICINE CLINIC | Age: 65
End: 2020-05-28
Payer: MEDICARE

## 2020-05-28 VITALS
OXYGEN SATURATION: 97 % | TEMPERATURE: 98.5 F | RESPIRATION RATE: 18 BRPM | BODY MASS INDEX: 33.91 KG/M2 | HEART RATE: 65 BPM | HEIGHT: 72 IN

## 2020-05-28 PROCEDURE — 99213 OFFICE O/P EST LOW 20 MIN: CPT | Performed by: PHYSICIAN ASSISTANT

## 2020-05-28 RX ORDER — AMOXICILLIN 500 MG/1
500 CAPSULE ORAL 3 TIMES DAILY
Qty: 30 CAPSULE | Refills: 0 | Status: SHIPPED | OUTPATIENT
Start: 2020-05-28 | End: 2020-06-07

## 2020-05-28 ASSESSMENT — ENCOUNTER SYMPTOMS
FACIAL SWELLING: 0
EYES NEGATIVE: 1
SORE THROAT: 0
TROUBLE SWALLOWING: 0
VOICE CHANGE: 0
SINUS PRESSURE: 0
SINUS PAIN: 0
RESPIRATORY NEGATIVE: 1
GASTROINTESTINAL NEGATIVE: 1
RHINORRHEA: 0

## 2020-06-22 ENCOUNTER — TELEPHONE (OUTPATIENT)
Dept: FAMILY MEDICINE CLINIC | Age: 65
End: 2020-06-22

## 2020-06-25 ENCOUNTER — OFFICE VISIT (OUTPATIENT)
Dept: FAMILY MEDICINE CLINIC | Age: 65
End: 2020-06-25
Payer: MEDICARE

## 2020-06-25 VITALS
TEMPERATURE: 97.8 F | DIASTOLIC BLOOD PRESSURE: 84 MMHG | OXYGEN SATURATION: 96 % | RESPIRATION RATE: 18 BRPM | HEART RATE: 70 BPM | HEIGHT: 72 IN | BODY MASS INDEX: 33.91 KG/M2 | SYSTOLIC BLOOD PRESSURE: 130 MMHG

## 2020-06-25 PROCEDURE — 99213 OFFICE O/P EST LOW 20 MIN: CPT | Performed by: PHYSICIAN ASSISTANT

## 2020-06-25 RX ORDER — ALLOPURINOL 100 MG/1
TABLET ORAL
Qty: 180 TABLET | Refills: 1 | Status: SHIPPED
Start: 2020-06-25 | End: 2021-04-09

## 2020-06-25 RX ORDER — AMLODIPINE BESYLATE 5 MG/1
5 TABLET ORAL DAILY
Qty: 90 TABLET | Refills: 1 | Status: SHIPPED | OUTPATIENT
Start: 2020-06-25

## 2020-06-25 RX ORDER — LEVOTHYROXINE SODIUM 0.15 MG/1
150 TABLET ORAL DAILY
Qty: 90 TABLET | Refills: 1 | Status: SHIPPED | OUTPATIENT
Start: 2020-06-25

## 2020-06-25 RX ORDER — CITALOPRAM 20 MG/1
20 TABLET ORAL DAILY
Qty: 90 TABLET | Refills: 1 | Status: SHIPPED | OUTPATIENT
Start: 2020-06-25

## 2020-06-25 ASSESSMENT — ENCOUNTER SYMPTOMS
GASTROINTESTINAL NEGATIVE: 1
ALLERGIC/IMMUNOLOGIC NEGATIVE: 1
RESPIRATORY NEGATIVE: 1
EYES NEGATIVE: 1

## 2020-06-25 NOTE — PROGRESS NOTES
Future    Depression, unspecified depression type  Comments:  -mood doing well on Celexa  Orders:  -     citalopram (CELEXA) 20 MG tablet; Take 1 tablet by mouth daily    Hypothyroidism, unspecified type  Comments:  -due for labs today  Orders:  -     levothyroxine (SYNTHROID) 150 MCG tablet; Take 1 tablet by mouth Daily  -     TSH with Reflex; Future        She will get fasting blood work which she is due for. He will continue current medication which were sent in. He will see Dr. Lalitha Foote when he opens up his practice in the fall.     MAIA JosephC

## 2020-06-25 NOTE — PROGRESS NOTES
6/25/20    Name: Glory Tovar  EDV:[birth year]   Sex:male   Age:65 y.o. Chief Complaint   Patient presents with    6 Month Follow-Up    Hypertension    Medication Refill     HPI  Review of Systems   All other systems reviewed and are negative. Current Outpatient Medications:     allopurinol (ZYLOPRIM) 100 MG tablet, Take 2 tabs qd, Disp: 180 tablet, Rfl: 1    amLODIPine (NORVASC) 5 MG tablet, Take 1 tablet by mouth daily, Disp: 90 tablet, Rfl: 1    citalopram (CELEXA) 20 MG tablet, Take 1 tablet by mouth daily, Disp: 90 tablet, Rfl: 1    levothyroxine (SYNTHROID) 150 MCG tablet, Take 1 tablet by mouth Daily, Disp: 90 tablet, Rfl: 1    triamcinolone (KENALOG) 0.1 % cream, Apply topically 2 times daily, Disp: 80 g, Rfl: 3    cycloSPORINE (RESTASIS) 0.05 % ophthalmic emulsion, Place 1 drop into both eyes 2 times daily, Disp: , Rfl:     MAGNESIUM OXIDE PO, Take by mouth, Disp: , Rfl:     tamsulosin (FLOMAX) 0.4 MG capsule, take 1 capsule by mouth once daily, Disp: , Rfl: 1    Calcium Carbonate-Vitamin D (OYSTER SHELL CALCIUM/D) 500-200 MG-UNIT TABS, Take 1 tablet by mouth daily, Disp: , Rfl:     Misc Natural Products (OSTEO BI-FLEX JOINT SHIELD PO), Take by mouth, Disp: , Rfl:     potassium citrate (UROCIT-K) 10 MEQ (1080 MG) extended release tablet, , Disp: , Rfl: 1    BACLOFEN, PAIN PUMP REFILL CHARGE,, , Disp: , Rfl:     gabapentin (NEURONTIN) 100 MG capsule, Take 100 mg by mouth 2 times daily, Disp: , Rfl:     aspirin 81 MG tablet, Take 81 mg by mouth daily.   , Disp: , Rfl:     therapeutic multivitamin-minerals (THERAGRAN-M) tablet, Take 1 tablet by mouth daily , Disp: , Rfl:   Allergies   Allergen Reactions    Septra [Bactrim]      SEPTRA DS REDNESS HIVES AND HARD TIME BREATHING    Sulfamethoxazole-Trimethoprim Rash      Past Medical History:   Diagnosis Date    Bleeding tendency ([address]     Diabetes mellitus ([address]     DVT (deep venous thrombosis) (HCC)     subclavian vein  Gout     Hydrocephalus ([address]     Hypertension     Hypothyroidism     Intracranial bleed ([address]     Left knee pain 7/31/2018    Pain of right side of body     RADIATES TO RT LEG    Tingling     SOMRTIMES RT HAND & LEG    Traumatic brain injury ([address] 2008    GAS WELL EXPLOSION WITH  TRAUMATIC BRAIN INJURY AND RIGHTHEMIPARESIS     Urination decrease     Visual disturbances     Weakness of right side of body      Patient Active Problem List    Diagnosis Date Noted    Hypothyroidism 01/08/2020    Depression 01/08/2020    Right hemiparesis ([address] 01/08/2020    Essential hypertension 01/08/2020    Gout 01/08/2020    Post-traumatic hydrocephalus ([address] 01/08/2020    Subdural hematoma ([address] 03/01/2017    Subarachnoid hemorrhage ([address] 03/01/2017      Past Surgical History:   Procedure Laterality Date    BACLOFEN PUMP IMPLANTATION  07/2015    BRAIN SURGERY       SHUNT JULY 2008    FRACTURE SURGERY      RIGHT ARM WITH WINTER PLACEMENT    HIP SURGERY      HETEROTOPIC OSSIFICATION RIGHT HIP    JOINT REPLACEMENT      RIGHT KNEE ARTHROSCOPIC    KIDNEY STONE SURGERY      PARTIAL NEPHRECTOMY      VENTRICULOPERITONEAL SHUNT  2008      Social History     Tobacco History     Smoking Status  Never Smoker    Smokeless Tobacco Use  Never Used          Alcohol History     Alcohol Use Status  No          Drug Use     Drug Use Status  No          Sexual Activity     Sexually Active  Not Asked

## 2020-08-13 ENCOUNTER — HOSPITAL ENCOUNTER (OUTPATIENT)
Age: 65
Discharge: HOME | End: 2020-08-15
Payer: MEDICARE

## 2020-08-13 LAB
ALBUMIN SERPL-MCNC: 4 G/DL (ref 3.5–5.2)
ALP BLD-CCNC: 100 U/L (ref 40–129)
ALT SERPL-CCNC: 33 U/L (ref 0–40)
ANION GAP SERPL CALCULATED.3IONS-SCNC: 17 MMOL/L (ref 7–16)
AST SERPL-CCNC: 32 U/L (ref 0–39)
BASOPHILS ABSOLUTE: 0.04 E9/L (ref 0–0.2)
BASOPHILS RELATIVE PERCENT: 0.8 % (ref 0–2)
BILIRUB SERPL-MCNC: 0.5 MG/DL (ref 0–1.2)
BUN BLDV-MCNC: 25 MG/DL (ref 8–23)
CALCIUM SERPL-MCNC: 9.4 MG/DL (ref 8.6–10.2)
CHLORIDE BLD-SCNC: 105 MMOL/L (ref 98–107)
CHOLESTEROL, TOTAL: 129 MG/DL (ref 0–199)
CO2: 21 MMOL/L (ref 22–29)
CREAT SERPL-MCNC: 1.5 MG/DL (ref 0.7–1.2)
EOSINOPHILS ABSOLUTE: 0.17 E9/L (ref 0.05–0.5)
EOSINOPHILS RELATIVE PERCENT: 3.6 % (ref 0–6)
GFR AFRICAN AMERICAN: 57
GFR NON-AFRICAN AMERICAN: 47 ML/MIN/1.73
GLUCOSE BLD-MCNC: 84 MG/DL (ref 74–99)
HCT VFR BLD CALC: 44.3 % (ref 37–54)
HDLC SERPL-MCNC: 26 MG/DL
HEMOGLOBIN: 14.2 G/DL (ref 12.5–16.5)
IMMATURE GRANULOCYTES #: 0.02 E9/L
IMMATURE GRANULOCYTES %: 0.4 % (ref 0–5)
LDL CHOLESTEROL CALCULATED: 74 MG/DL (ref 0–99)
LYMPHOCYTES ABSOLUTE: 1.22 E9/L (ref 1.5–4)
LYMPHOCYTES RELATIVE PERCENT: 25.8 % (ref 20–42)
MCH RBC QN AUTO: 30.8 PG (ref 26–35)
MCHC RBC AUTO-ENTMCNC: 32.1 % (ref 32–34.5)
MCV RBC AUTO: 96.1 FL (ref 80–99.9)
MONOCYTES ABSOLUTE: 0.34 E9/L (ref 0.1–0.95)
MONOCYTES RELATIVE PERCENT: 7.2 % (ref 2–12)
NEUTROPHILS ABSOLUTE: 2.93 E9/L (ref 1.8–7.3)
NEUTROPHILS RELATIVE PERCENT: 62.2 % (ref 43–80)
PDW BLD-RTO: 15.4 FL (ref 11.5–15)
PLATELET # BLD: 145 E9/L (ref 130–450)
PMV BLD AUTO: 10.5 FL (ref 7–12)
POTASSIUM SERPL-SCNC: 4.6 MMOL/L (ref 3.5–5)
PROSTATE SPECIFIC ANTIGEN: 1.53 NG/ML (ref 0–4)
RBC # BLD: 4.61 E12/L (ref 3.8–5.8)
SODIUM BLD-SCNC: 143 MMOL/L (ref 132–146)
TOTAL PROTEIN: 7.4 G/DL (ref 6.4–8.3)
TRIGL SERPL-MCNC: 143 MG/DL (ref 0–149)
URIC ACID, SERUM: 5.9 MG/DL (ref 3.4–7)
VLDLC SERPL CALC-MCNC: 29 MG/DL
WBC # BLD: 4.7 E9/L (ref 4.5–11.5)

## 2020-08-13 PROCEDURE — 85025 COMPLETE CBC W/AUTO DIFF WBC: CPT

## 2020-08-13 PROCEDURE — 84550 ASSAY OF BLOOD/URIC ACID: CPT

## 2020-08-13 PROCEDURE — 80053 COMPREHEN METABOLIC PANEL: CPT

## 2020-08-13 PROCEDURE — 80061 LIPID PANEL: CPT

## 2020-08-13 PROCEDURE — 84443 ASSAY THYROID STIM HORMONE: CPT

## 2020-08-13 PROCEDURE — G0103 PSA SCREENING: HCPCS

## 2020-08-13 PROCEDURE — 36415 COLL VENOUS BLD VENIPUNCTURE: CPT

## 2020-08-14 LAB — TSH SERPL DL<=0.05 MIU/L-ACNC: 1.98 UIU/ML (ref 0.27–4.2)

## 2021-03-13 ENCOUNTER — HOSPITAL ENCOUNTER (EMERGENCY)
Age: 66
Discharge: HOME | End: 2021-03-13
Payer: MEDICARE

## 2021-03-13 ENCOUNTER — APPOINTMENT (OUTPATIENT)
Dept: GENERAL RADIOLOGY | Age: 66
End: 2021-03-13
Payer: MEDICARE

## 2021-03-13 VITALS
OXYGEN SATURATION: 99 % | BODY MASS INDEX: 33.86 KG/M2 | RESPIRATION RATE: 18 BRPM | HEART RATE: 68 BPM | TEMPERATURE: 97.5 F | HEIGHT: 72 IN | SYSTOLIC BLOOD PRESSURE: 128 MMHG | DIASTOLIC BLOOD PRESSURE: 68 MMHG | WEIGHT: 250 LBS
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
## 2021-03-13 DIAGNOSIS — M25.561 ACUTE PAIN OF BOTH KNEES: Primary | ICD-10-CM

## 2021-03-13 DIAGNOSIS — M19.90 ARTHRITIS: ICD-10-CM

## 2021-03-13 DIAGNOSIS — M25.562 ACUTE PAIN OF BOTH KNEES: Primary | ICD-10-CM

## 2021-03-13 PROCEDURE — 73560 X-RAY EXAM OF KNEE 1 OR 2: CPT

## 2021-03-13 PROCEDURE — 99284 EMERGENCY DEPT VISIT MOD MDM: CPT

## 2021-03-13 PROCEDURE — 90471 IMMUNIZATION ADMIN: CPT | Performed by: NURSE PRACTITIONER

## 2021-03-13 PROCEDURE — 90715 TDAP VACCINE 7 YRS/> IM: CPT | Performed by: NURSE PRACTITIONER

## 2021-03-13 PROCEDURE — 6370000000 HC RX 637 (ALT 250 FOR IP): Performed by: NURSE PRACTITIONER

## 2021-03-13 PROCEDURE — 6360000002 HC RX W HCPCS: Performed by: NURSE PRACTITIONER

## 2021-03-13 RX ORDER — GINSENG 100 MG
CAPSULE ORAL ONCE
Status: COMPLETED | OUTPATIENT
Start: 2021-03-13 | End: 2021-03-13

## 2021-03-13 RX ADMIN — BACITRACIN: 500 OINTMENT TOPICAL at 16:14

## 2021-03-13 RX ADMIN — TETANUS TOXOID, REDUCED DIPHTHERIA TOXOID AND ACELLULAR PERTUSSIS VACCINE, ADSORBED 0.5 ML: 5; 2.5; 8; 8; 2.5 SUSPENSION INTRAMUSCULAR at 16:14
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
## 2021-04-09 ENCOUNTER — HOSPITAL ENCOUNTER (OUTPATIENT)
Age: 66
Setting detail: OBSERVATION
Discharge: HOME | End: 2021-04-11
Attending: EMERGENCY MEDICINE | Admitting: INTERNAL MEDICINE
Payer: MEDICARE

## 2021-04-09 ENCOUNTER — APPOINTMENT (OUTPATIENT)
Dept: GENERAL RADIOLOGY | Age: 66
End: 2021-04-09
Payer: MEDICARE

## 2021-04-09 DIAGNOSIS — R07.9 CHEST PAIN, UNSPECIFIED TYPE: Primary | ICD-10-CM

## 2021-04-09 PROBLEM — I25.2 OLD MYOCARDIAL INFARCT: Status: ACTIVE | Noted: 2021-04-09

## 2021-04-09 LAB
ALBUMIN SERPL-MCNC: 4 G/DL (ref 3.5–5.2)
ALP BLD-CCNC: 108 U/L (ref 40–129)
ALT SERPL-CCNC: 30 U/L (ref 0–40)
ANION GAP SERPL CALCULATED.3IONS-SCNC: 10 MMOL/L (ref 7–16)
AST SERPL-CCNC: 32 U/L (ref 0–39)
BASOPHILS ABSOLUTE: 0.05 E9/L (ref 0–0.2)
BASOPHILS RELATIVE PERCENT: 0.9 % (ref 0–2)
BILIRUB SERPL-MCNC: 0.5 MG/DL (ref 0–1.2)
BUN BLDV-MCNC: 25 MG/DL (ref 8–23)
CALCIUM SERPL-MCNC: 9.8 MG/DL (ref 8.6–10.2)
CHLORIDE BLD-SCNC: 103 MMOL/L (ref 98–107)
CO2: 26 MMOL/L (ref 22–29)
CREAT SERPL-MCNC: 1.4 MG/DL (ref 0.7–1.2)
D DIMER: <200 NG/ML DDU
EKG ATRIAL RATE: 64 BPM
EKG P AXIS: 21 DEGREES
EKG P-R INTERVAL: 166 MS
EKG Q-T INTERVAL: 412 MS
EKG QRS DURATION: 94 MS
EKG QTC CALCULATION (BAZETT): 425 MS
EKG R AXIS: 9 DEGREES
EKG T AXIS: 87 DEGREES
EKG VENTRICULAR RATE: 64 BPM
EOSINOPHILS ABSOLUTE: 0.18 E9/L (ref 0.05–0.5)
EOSINOPHILS RELATIVE PERCENT: 3.4 % (ref 0–6)
GFR AFRICAN AMERICAN: >60
GFR NON-AFRICAN AMERICAN: 51 ML/MIN/1.73
GLUCOSE BLD-MCNC: 99 MG/DL (ref 74–99)
HCT VFR BLD CALC: 43.3 % (ref 37–54)
HEMOGLOBIN: 14.2 G/DL (ref 12.5–16.5)
IMMATURE GRANULOCYTES #: 0.03 E9/L
IMMATURE GRANULOCYTES %: 0.6 % (ref 0–5)
INR BLD: 1.2
LIPASE: 51 U/L (ref 13–60)
LYMPHOCYTES ABSOLUTE: 1.25 E9/L (ref 1.5–4)
LYMPHOCYTES RELATIVE PERCENT: 23.5 % (ref 20–42)
MCH RBC QN AUTO: 30.5 PG (ref 26–35)
MCHC RBC AUTO-ENTMCNC: 32.8 % (ref 32–34.5)
MCV RBC AUTO: 93.1 FL (ref 80–99.9)
MONOCYTES ABSOLUTE: 0.56 E9/L (ref 0.1–0.95)
MONOCYTES RELATIVE PERCENT: 10.5 % (ref 2–12)
NEUTROPHILS ABSOLUTE: 3.26 E9/L (ref 1.8–7.3)
NEUTROPHILS RELATIVE PERCENT: 61.1 % (ref 43–80)
PDW BLD-RTO: 14.9 FL (ref 11.5–15)
PLATELET # BLD: 146 E9/L (ref 130–450)
PMV BLD AUTO: 9.5 FL (ref 7–12)
POTASSIUM REFLEX MAGNESIUM: 4.5 MMOL/L (ref 3.5–5)
PRO-BNP: 125 PG/ML (ref 0–125)
PROTHROMBIN TIME: 12.4 SEC (ref 9.3–12.4)
RBC # BLD: 4.65 E12/L (ref 3.8–5.8)
SARS-COV-2, NAAT: NOT DETECTED
SODIUM BLD-SCNC: 139 MMOL/L (ref 132–146)
TOTAL PROTEIN: 7.6 G/DL (ref 6.4–8.3)
TROPONIN: <0.01 NG/ML (ref 0–0.03)
TROPONIN: <0.01 NG/ML (ref 0–0.03)
WBC # BLD: 5.3 E9/L (ref 4.5–11.5)

## 2021-04-09 PROCEDURE — 83880 ASSAY OF NATRIURETIC PEPTIDE: CPT

## 2021-04-09 PROCEDURE — 85610 PROTHROMBIN TIME: CPT

## 2021-04-09 PROCEDURE — G0378 HOSPITAL OBSERVATION PER HR: HCPCS

## 2021-04-09 PROCEDURE — 83690 ASSAY OF LIPASE: CPT

## 2021-04-09 PROCEDURE — 36415 COLL VENOUS BLD VENIPUNCTURE: CPT

## 2021-04-09 PROCEDURE — 6360000002 HC RX W HCPCS: Performed by: INTERNAL MEDICINE

## 2021-04-09 PROCEDURE — 85025 COMPLETE CBC W/AUTO DIFF WBC: CPT

## 2021-04-09 PROCEDURE — 93005 ELECTROCARDIOGRAM TRACING: CPT | Performed by: EMERGENCY MEDICINE

## 2021-04-09 PROCEDURE — 96372 THER/PROPH/DIAG INJ SC/IM: CPT

## 2021-04-09 PROCEDURE — 93010 ELECTROCARDIOGRAM REPORT: CPT | Performed by: INTERNAL MEDICINE

## 2021-04-09 PROCEDURE — 80053 COMPREHEN METABOLIC PANEL: CPT

## 2021-04-09 PROCEDURE — 6370000000 HC RX 637 (ALT 250 FOR IP): Performed by: EMERGENCY MEDICINE

## 2021-04-09 PROCEDURE — 85378 FIBRIN DEGRADE SEMIQUANT: CPT

## 2021-04-09 PROCEDURE — 6370000000 HC RX 637 (ALT 250 FOR IP): Performed by: INTERNAL MEDICINE

## 2021-04-09 PROCEDURE — 84484 ASSAY OF TROPONIN QUANT: CPT

## 2021-04-09 PROCEDURE — 87635 SARS-COV-2 COVID-19 AMP PRB: CPT

## 2021-04-09 PROCEDURE — 71045 X-RAY EXAM CHEST 1 VIEW: CPT

## 2021-04-09 PROCEDURE — 99284 EMERGENCY DEPT VISIT MOD MDM: CPT

## 2021-04-09 RX ORDER — ALLOPURINOL 100 MG/1
200 TABLET ORAL DAILY
COMMUNITY

## 2021-04-09 RX ORDER — ASPIRIN 81 MG/1
81 TABLET, CHEWABLE ORAL DAILY
Status: DISCONTINUED | OUTPATIENT
Start: 2021-04-10 | End: 2021-04-11 | Stop reason: HOSPADM

## 2021-04-09 RX ORDER — TAMSULOSIN HYDROCHLORIDE 0.4 MG/1
0.4 CAPSULE ORAL DAILY
Status: DISCONTINUED | OUTPATIENT
Start: 2021-04-09 | End: 2021-04-11 | Stop reason: HOSPADM

## 2021-04-09 RX ORDER — PROMETHAZINE HYDROCHLORIDE 25 MG/1
12.5 TABLET ORAL EVERY 6 HOURS PRN
Status: DISCONTINUED | OUTPATIENT
Start: 2021-04-09 | End: 2021-04-11 | Stop reason: HOSPADM

## 2021-04-09 RX ORDER — CITALOPRAM 20 MG/1
20 TABLET ORAL DAILY
Status: DISCONTINUED | OUTPATIENT
Start: 2021-04-09 | End: 2021-04-11 | Stop reason: HOSPADM

## 2021-04-09 RX ORDER — B-COMPLEX WITH VITAMIN C
1 TABLET ORAL DAILY
Status: DISCONTINUED | OUTPATIENT
Start: 2021-04-09 | End: 2021-04-09 | Stop reason: CLARIF

## 2021-04-09 RX ORDER — GABAPENTIN 100 MG/1
100 CAPSULE ORAL 2 TIMES DAILY
Status: DISCONTINUED | OUTPATIENT
Start: 2021-04-09 | End: 2021-04-11 | Stop reason: HOSPADM

## 2021-04-09 RX ORDER — ATORVASTATIN CALCIUM 40 MG/1
40 TABLET, FILM COATED ORAL NIGHTLY
Status: DISCONTINUED | OUTPATIENT
Start: 2021-04-09 | End: 2021-04-11 | Stop reason: HOSPADM

## 2021-04-09 RX ORDER — LEVOTHYROXINE SODIUM 0.07 MG/1
150 TABLET ORAL DAILY
Status: DISCONTINUED | OUTPATIENT
Start: 2021-04-09 | End: 2021-04-11 | Stop reason: HOSPADM

## 2021-04-09 RX ORDER — ONDANSETRON 2 MG/ML
4 INJECTION INTRAMUSCULAR; INTRAVENOUS EVERY 6 HOURS PRN
Status: DISCONTINUED | OUTPATIENT
Start: 2021-04-09 | End: 2021-04-11 | Stop reason: HOSPADM

## 2021-04-09 RX ORDER — ACETAMINOPHEN 650 MG/1
650 SUPPOSITORY RECTAL EVERY 6 HOURS PRN
Status: DISCONTINUED | OUTPATIENT
Start: 2021-04-09 | End: 2021-04-11 | Stop reason: HOSPADM

## 2021-04-09 RX ORDER — CYCLOSPORINE 0.5 MG/ML
1 EMULSION OPHTHALMIC 2 TIMES DAILY
Status: DISCONTINUED | OUTPATIENT
Start: 2021-04-09 | End: 2021-04-09 | Stop reason: CLARIF

## 2021-04-09 RX ORDER — AMLODIPINE BESYLATE 5 MG/1
5 TABLET ORAL DAILY
Status: DISCONTINUED | OUTPATIENT
Start: 2021-04-09 | End: 2021-04-11 | Stop reason: HOSPADM

## 2021-04-09 RX ORDER — M-VIT,TX,IRON,MINS/CALC/FOLIC 27MG-0.4MG
1 TABLET ORAL DAILY
Status: DISCONTINUED | OUTPATIENT
Start: 2021-04-09 | End: 2021-04-11 | Stop reason: HOSPADM

## 2021-04-09 RX ORDER — ACETAMINOPHEN 325 MG/1
650 TABLET ORAL EVERY 6 HOURS PRN
Status: DISCONTINUED | OUTPATIENT
Start: 2021-04-09 | End: 2021-04-11 | Stop reason: HOSPADM

## 2021-04-09 RX ORDER — TRIAMCINOLONE ACETONIDE 1 MG/G
CREAM TOPICAL 2 TIMES DAILY
Status: DISCONTINUED | OUTPATIENT
Start: 2021-04-09 | End: 2021-04-11 | Stop reason: HOSPADM

## 2021-04-09 RX ORDER — POLYVINYL ALCOHOL 14 MG/ML
1 SOLUTION/ DROPS OPHTHALMIC EVERY 4 HOURS PRN
Status: DISCONTINUED | OUTPATIENT
Start: 2021-04-09 | End: 2021-04-11 | Stop reason: HOSPADM

## 2021-04-09 RX ORDER — OYSTER SHELL CALCIUM WITH VITAMIN D 500; 200 MG/1; [IU]/1
1 TABLET, FILM COATED ORAL
Status: DISCONTINUED | OUTPATIENT
Start: 2021-04-09 | End: 2021-04-11 | Stop reason: HOSPADM

## 2021-04-09 RX ORDER — ASPIRIN 325 MG
325 TABLET ORAL ONCE
Status: COMPLETED | OUTPATIENT
Start: 2021-04-09 | End: 2021-04-09

## 2021-04-09 RX ORDER — ALLOPURINOL 100 MG/1
200 TABLET ORAL DAILY
Status: DISCONTINUED | OUTPATIENT
Start: 2021-04-09 | End: 2021-04-11 | Stop reason: HOSPADM

## 2021-04-09 RX ADMIN — ENOXAPARIN SODIUM 40 MG: 40 INJECTION SUBCUTANEOUS at 17:58

## 2021-04-09 RX ADMIN — GABAPENTIN 100 MG: 100 CAPSULE ORAL at 20:28

## 2021-04-09 RX ADMIN — ATORVASTATIN CALCIUM 40 MG: 40 TABLET, FILM COATED ORAL at 20:28

## 2021-04-09 RX ADMIN — ASPIRIN 325 MG: 325 TABLET, FILM COATED ORAL at 17:59

## 2021-04-09 ASSESSMENT — PAIN SCALES - GENERAL: PAINLEVEL_OUTOF10: 0

## 2021-04-09 ASSESSMENT — ENCOUNTER SYMPTOMS
BACK PAIN: 0
COUGH: 0
VOMITING: 0
SINUS PRESSURE: 0
ABDOMINAL PAIN: 0
NAUSEA: 0
SHORTNESS OF BREATH: 1
CHOKING: 0
EYE REDNESS: 0

## 2021-04-09 NOTE — PLAN OF CARE
Problem: Falls - Risk of:  Goal: Will remain free from falls  Description: Will remain free from falls  4/9/2021 1703 by Johanny Bah RN  Outcome: Met This Shift  Goal: Absence of physical injury  Description: Absence of physical injury  4/9/2021 1703 by Johanny Bah RN  Outcome: Met This Shift     Problem: Pain:  Goal: Pain level will decrease  Description: Pain level will decrease  4/9/2021 1853 by Lea Bell RN  Outcome: Met This Shift  4/9/2021 1703 by Johanny Bah RN  Outcome: Met This Shift  Goal: Control of acute pain  Description: Control of acute pain  4/9/2021 1703 by Johanny Bah RN  Outcome: Met This Shift  Goal: Control of chronic pain  Description: Control of chronic pain  4/9/2021 1703 by Johanny Bah RN  Outcome: Met This Shift

## 2021-04-09 NOTE — PROGRESS NOTES
I was called by the emergency department for this patient for admission. I placed admission orders. Dr. Corey Cardona will see the patient in the morning for H&P.     Electronically signed by Lilly Osman DO on 4/9/2021 at 4:05 PM
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
## 2021-04-09 NOTE — PROGRESS NOTES
Database complete. Medications reconciled. Care plans and education initiated. Right hemiparesis. Hx of left nephrectomy. Baclofen pump to right abdomen. Recent fall 3 weeks ago transferring between chairs. Urologist is Dr. Stewart Cates. Nephrologist is Dr. Ulises Terry. Pt goes by \"Bill\".

## 2021-04-10 ENCOUNTER — APPOINTMENT (OUTPATIENT)
Dept: NUCLEAR MEDICINE | Age: 66
End: 2021-04-10
Payer: MEDICARE

## 2021-04-10 ENCOUNTER — APPOINTMENT (OUTPATIENT)
Dept: ULTRASOUND IMAGING | Age: 66
End: 2021-04-10
Payer: MEDICARE

## 2021-04-10 PROBLEM — I20.0 UNSTABLE ANGINA (HCC): Status: ACTIVE | Noted: 2021-04-10

## 2021-04-10 LAB
ALBUMIN SERPL-MCNC: 3.8 G/DL (ref 3.5–5.2)
ALP BLD-CCNC: 100 U/L (ref 40–129)
ALT SERPL-CCNC: 28 U/L (ref 0–40)
ANION GAP SERPL CALCULATED.3IONS-SCNC: 9 MMOL/L (ref 7–16)
AST SERPL-CCNC: 28 U/L (ref 0–39)
BILIRUB SERPL-MCNC: 0.7 MG/DL (ref 0–1.2)
BUN BLDV-MCNC: 21 MG/DL (ref 8–23)
CALCIUM SERPL-MCNC: 9.4 MG/DL (ref 8.6–10.2)
CHLORIDE BLD-SCNC: 104 MMOL/L (ref 98–107)
CHOLESTEROL, TOTAL: 136 MG/DL (ref 0–199)
CO2: 27 MMOL/L (ref 22–29)
CREAT SERPL-MCNC: 1.3 MG/DL (ref 0.7–1.2)
GFR AFRICAN AMERICAN: >60
GFR NON-AFRICAN AMERICAN: 56 ML/MIN/1.73
GLUCOSE BLD-MCNC: 86 MG/DL (ref 74–99)
HCT VFR BLD CALC: 42.5 % (ref 37–54)
HDLC SERPL-MCNC: 23 MG/DL
HEMOGLOBIN: 13.7 G/DL (ref 12.5–16.5)
LDL CHOLESTEROL CALCULATED: 73 MG/DL (ref 0–99)
LV EF: 64 %
LVEF MODALITY: NORMAL
MCH RBC QN AUTO: 30.4 PG (ref 26–35)
MCHC RBC AUTO-ENTMCNC: 32.2 % (ref 32–34.5)
MCV RBC AUTO: 94.2 FL (ref 80–99.9)
PDW BLD-RTO: 14.7 FL (ref 11.5–15)
PLATELET # BLD: 135 E9/L (ref 130–450)
PMV BLD AUTO: 9.6 FL (ref 7–12)
POTASSIUM REFLEX MAGNESIUM: 4.2 MMOL/L (ref 3.5–5)
RBC # BLD: 4.51 E12/L (ref 3.8–5.8)
SODIUM BLD-SCNC: 140 MMOL/L (ref 132–146)
TOTAL PROTEIN: 7.1 G/DL (ref 6.4–8.3)
TRIGL SERPL-MCNC: 198 MG/DL (ref 0–149)
TROPONIN: <0.01 NG/ML (ref 0–0.03)
VLDLC SERPL CALC-MCNC: 40 MG/DL
WBC # BLD: 4.4 E9/L (ref 4.5–11.5)

## 2021-04-10 PROCEDURE — 93017 CV STRESS TEST TRACING ONLY: CPT

## 2021-04-10 PROCEDURE — 3430000000 HC RX DIAGNOSTIC RADIOPHARMACEUTICAL: Performed by: RADIOLOGY

## 2021-04-10 PROCEDURE — 78452 HT MUSCLE IMAGE SPECT MULT: CPT | Performed by: INTERNAL MEDICINE

## 2021-04-10 PROCEDURE — G0378 HOSPITAL OBSERVATION PER HR: HCPCS

## 2021-04-10 PROCEDURE — 36415 COLL VENOUS BLD VENIPUNCTURE: CPT

## 2021-04-10 PROCEDURE — 6360000002 HC RX W HCPCS: Performed by: INTERNAL MEDICINE

## 2021-04-10 PROCEDURE — 6370000000 HC RX 637 (ALT 250 FOR IP): Performed by: INTERNAL MEDICINE

## 2021-04-10 PROCEDURE — 84484 ASSAY OF TROPONIN QUANT: CPT

## 2021-04-10 PROCEDURE — 85027 COMPLETE CBC AUTOMATED: CPT

## 2021-04-10 PROCEDURE — 93970 EXTREMITY STUDY: CPT

## 2021-04-10 PROCEDURE — A9500 TC99M SESTAMIBI: HCPCS | Performed by: RADIOLOGY

## 2021-04-10 PROCEDURE — 80053 COMPREHEN METABOLIC PANEL: CPT

## 2021-04-10 PROCEDURE — 80061 LIPID PANEL: CPT

## 2021-04-10 PROCEDURE — 78452 HT MUSCLE IMAGE SPECT MULT: CPT

## 2021-04-10 PROCEDURE — 96372 THER/PROPH/DIAG INJ SC/IM: CPT

## 2021-04-10 RX ADMIN — GABAPENTIN 100 MG: 100 CAPSULE ORAL at 20:11

## 2021-04-10 RX ADMIN — ASPIRIN 81 MG CHEWABLE TABLET 81 MG: 81 TABLET CHEWABLE at 12:56

## 2021-04-10 RX ADMIN — Medication 30 MILLICURIE: at 11:20

## 2021-04-10 RX ADMIN — ENOXAPARIN SODIUM 40 MG: 40 INJECTION SUBCUTANEOUS at 12:59

## 2021-04-10 RX ADMIN — ATORVASTATIN CALCIUM 40 MG: 40 TABLET, FILM COATED ORAL at 20:11

## 2021-04-10 RX ADMIN — TAMSULOSIN HYDROCHLORIDE 0.4 MG: 0.4 CAPSULE ORAL at 12:57

## 2021-04-10 RX ADMIN — CITALOPRAM HYDROBROMIDE 20 MG: 20 TABLET ORAL at 12:58

## 2021-04-10 RX ADMIN — REGADENOSON 0.4 MG: 0.08 INJECTION, SOLUTION INTRAVENOUS at 11:20

## 2021-04-10 RX ADMIN — AMLODIPINE BESYLATE 5 MG: 5 TABLET ORAL at 12:57

## 2021-04-10 RX ADMIN — ALLOPURINOL 200 MG: 100 TABLET ORAL at 12:55

## 2021-04-10 RX ADMIN — Medication 10 MILLICURIE: at 08:45

## 2021-04-10 RX ADMIN — GABAPENTIN 100 MG: 100 CAPSULE ORAL at 12:59

## 2021-04-10 RX ADMIN — Medication 1 TABLET: at 12:57

## 2021-04-10 RX ADMIN — CALCIUM CARBONATE-VITAMIN D TAB 500 MG-200 UNIT 1 TABLET: 500-200 TAB at 12:57

## 2021-04-10 NOTE — CONSULTS
[address] CONSULT    Name: Stacey Boyd    Age: 61 y.o. Date of Admission: 4/9/2021 12:50 PM    Date of Service: 4/10/2021    Reason for Consultation: Chest pain and shortness of breath    Chief Complaint: Shortness of breath and chest pain    Referring Physician: Dr. Tonio Hewitt    History of Present Illness: The patient is a 61y.o. year old male who has right-sided hemiplegia 13 years following intracranial hemorrhage. Developed shortness of breath and vague mid sternal chest discomfort at rest, that does not radiate and is not associated with nausea, vomiting or diaphoresis. He is nonambulatory. Denies palpitations, syncope or near syncope. No PND/orthopnea. Nuclear stress test from 2016 showed fixed distal anterior defect with anteroapical hypokinesia, LVEF 48%. Chest x-ray, EKG, telemetry independently reviewed. Chest x-ray: No vascular congestion, infiltrates or effusions. EKG: Sinus rhythm with poor R wave progression, possible old septal infarction. Telemetry: Sinus rhythm without heart block or pauses.   Troponin less than 0.01x2    Past Medical History:   Past Medical History:   Diagnosis Date    Bleeding tendency ([address]     Diabetes mellitus ([address]     DVT (deep venous thrombosis) (HCC)     subclavian vein    Gout     Hydrocephalus (HCC)     Hypertension     Hypothyroidism     Intracranial bleed ([address]     Left knee pain 7/31/2018    Pain of right side of body     RADIATES TO RT LEG    Tingling     SOMRTIMES RT HAND & LEG    Traumatic brain injury ([address] 2008    GAS WELL EXPLOSION WITH  TRAUMATIC BRAIN INJURY AND RIGHTHEMIPARESIS     Urination decrease     Visual disturbances     Weakness of right side of body        Past Surgical History:  Past Surgical History:   Procedure Laterality Date    BACLOFEN PUMP IMPLANTATION  07/2015    BRAIN SURGERY       SHUNT JULY 2008    FRACTURE SURGERY      RIGHT ARM WITH WINTER PLACEMENT    HIP SURGERY      HETEROTOPIC OSSIFICATION RIGHT HIP    JOINT REPLACEMENT      RIGHT KNEE ARTHROSCOPIC    KIDNEY STONE SURGERY      PARTIAL NEPHRECTOMY      VENTRICULOPERITONEAL SHUNT  2008       Family History:  Family History   Problem Relation Age of Onset    COPD Mother     Diabetes Mother     Other Father         Black lung    Cancer Brother         Lung, Stomach       Social History:  Social History     Socioeconomic History    Marital status:      Spouse name: Not on file    Number of children: Not on file    Years of education: Not on file    Highest education level: Not on file   Occupational History    Not on file   Social Needs    Financial resource strain: Not on file    Food insecurity     Worry: Not on file     Inability: Not on file    Transportation needs     Medical: Not on file     Non-medical: Not on file   Tobacco Use    Smoking status: Never Smoker    Smokeless tobacco: Never Used   Substance and Sexual Activity    Alcohol use: No    Drug use: Never    Sexual activity: Never   Lifestyle    Physical activity     Days per week: Not on file     Minutes per session: Not on file    Stress: Not on file   Relationships    Social connections     Talks on phone: Not on file     Gets together: Not on file     Attends [employer] service: Not on file     Active member of club or organization: Not on file     Attends meetings of clubs or organizations: Not on file     Relationship status: Not on file    Intimate partner violence     Fear of current or ex partner: Not on file     Emotionally abused: Not on file     Physically abused: Not on file     Forced sexual activity: Not on file   Other Topics Concern    Not on file   Social History Narrative    Not on file       Allergies:   Allergies   Allergen Reactions    Septra [Bactrim]      SEPTRA DS REDNESS HIVES AND HARD TIME BREATHING    Sulfamethoxazole-Trimethoprim Rash       Home Meds:  Prior to Admission medications Medication Sig Start Date End Date Taking?  Authorizing Provider   allopurinol (ZYLOPRIM) 100 MG tablet Take 200 mg by mouth daily   Yes Historical Provider, MD   amLODIPine (NORVASC) 5 MG tablet Take 1 tablet by mouth daily 6/25/20  Yes Champ Sandoval PA-C   citalopram (CELEXA) 20 MG tablet Take 1 tablet by mouth daily 6/25/20  Yes Champ Sandoval PA-C   levothyroxine (SYNTHROID) 150 MCG tablet Take 1 tablet by mouth Daily 6/25/20  Yes Champ Sandoval PA-C   MAGNESIUM OXIDE PO Take 40 mg by mouth daily    Yes Historical Provider, MD   tamsulosin (FLOMAX) 0.4 MG capsule Take 0.4 mg by mouth nightly  4/12/18  Yes Historical Provider, MD   Calcium Carbonate-Vitamin D (OYSTER SHELL CALCIUM/D) 500-200 MG-UNIT TABS Take 1 tablet by mouth 2 times daily    Yes Historical Provider, MD   Misc Natural Products (OSTEO BI-FLEX JOINT SHIELD PO) Take 1 tablet by mouth daily    Yes Historical Provider, MD   potassium citrate (UROCIT-K) 10 MEQ (1080 MG) extended release tablet Take 10 mEq by mouth 2 times daily  1/15/17  Yes Historical Provider, MD   BACLOFEN, PAIN PUMP REFILL CHARGE, 2.83 mcg by Implant route continuous    Yes Historical Provider, MD   gabapentin (NEURONTIN) 100 MG capsule Take 100 mg by mouth 2 times daily   Yes Historical Provider, MD   aspirin 81 MG tablet Take 81 mg by mouth nightly    Yes Historical Provider, MD   therapeutic multivitamin-minerals (THERAGRAN-M) tablet Take 1 tablet by mouth daily    Yes Historical Provider, MD       Current Medications:  Current Facility-Administered Medications   Medication Dose Route Frequency Provider Last Rate Last Admin    therapeutic multivitamin-minerals 1 tablet  1 tablet Oral Daily Felipe Smith DO        gabapentin (NEURONTIN) capsule 100 mg  100 mg Oral BID Felipe Smith DO   100 mg at 04/09/21 2028    tamsulosin (FLOMAX) capsule 0.4 mg  0.4 mg Oral Daily Felipe Smith DO        triamcinolone (KENALOG) 0.1 % cream   Topical BID Felipe Smith DO        allopurinol (ZYLOPRIM) tablet 200 mg  200 mg Oral Daily Felipe Smith, DO        amLODIPine (NORVASC) tablet 5 mg  5 mg Oral Daily Felipe Smith, DO        citalopram (CELEXA) tablet 20 mg  20 mg Oral Daily Felipe Smith, DO        levothyroxine (SYNTHROID) tablet 150 mcg  150 mcg Oral Daily Felipe Smith, DO        promethazine (PHENERGAN) tablet 12.5 mg  12.5 mg Oral Q6H PRN Felipe Smith,         Or    ondansetron (ZOFRAN) injection 4 mg  4 mg Intravenous Q6H PRN Felipe Smith, DO        acetaminophen (TYLENOL) tablet 650 mg  650 mg Oral Q6H PRN Felipe Smith, DO        Or    acetaminophen (TYLENOL) suppository 650 mg  650 mg Rectal Q6H PRN Felipe Smith, DO        magnesium hydroxide (MILK OF MAGNESIA) 400 MG/5ML suspension 30 mL  30 mL Oral Daily PRN Felipe Smith, DO        aspirin chewable tablet 81 mg  81 mg Oral Daily Felipe Smith, DO        enoxaparin (LOVENOX) injection 40 mg  40 mg Subcutaneous Daily Felipe Smith, DO   40 mg at 04/09/21 1758    atorvastatin (LIPITOR) tablet 40 mg  40 mg Oral Nightly Felipe Smith, DO   40 mg at 04/09/21 2028    calcium-vitamin D (OSCAL-500) 500-200 MG-UNIT per tablet 1 tablet  1 tablet Oral Daily with breakfast Felipe Smith, DO        polyvinyl alcohol (LIQUIFILM TEARS) 1.4 % ophthalmic solution 1 drop  1 drop Both Eyes Q4H PRN Felipe Smith,              Review of Systems:   Constitutional: No fever, chills, sweats  Cardiac: As per HPI  Pulmonary: No cough, wheeze, hemoptysis  HEENT: No visual disturbances or difficult swallowing  GI: No nausea, vomiting, diarrhea, abdominal pain, rectal bleeding  : No dysuria or hematuria  Endocrine: No excessive thirst, heat or cold intolerance. Musculoskeletal: No joint pain or muscle aches.  No claudication  Skin: No skin breakdown or rashes  Neuro: No headache, confusion, or seizures  Psych: No depression, anxiety    Physical Exam:  /70   Pulse 58   Temp 97.5 °F (36.4 °C) (Tympanic) Resp 16   Ht 6' (1.829 m)   Wt 252 lb (114.3 kg)   SpO2 96%   BMI 34.18 kg/m²   Weight change: Wt Readings from Last 3 Encounters:   04/10/21 252 lb (114.3 kg)   03/13/21 250 lb (113.4 kg)   09/18/18 250 lb (113.4 kg)       General: Awake, alert, oriented x3, no acute distress    HEENT: Normocephalic and atraumatic, pupils equal and round. Sclera nonicteric and oral mucosa moist.  Tongue and trachea midline. Neck: No JVD or bruits. No thyroid enlargement or adenopathy    Cardiac: Regular rate and rhythm, normal S1 and S2, no S3. Apical impulse is normal.  No murmurs, rubs or clicks. No carotid bruits and no abdominal bruits. No peripheral edema, cyanosis or clubbing. Normal pulses in the upper and lower extremities bilaterally. Resp: Unlabored respirations at rest.  No wheezes, rales or rhonchi. Abdomen: soft, nontender, nondistended, no gross organomegaly or mass    Skin: Warm and dry, no cyanosis. Musculoskeletal: normal tone and strength in the upper and lower extremities bilaterally    Neuro: Right-sided hemiplegia. Cranial nerves intact. Psych: Cooperative, and normal affect    Intake/Output:    Intake/Output Summary (Last 24 hours) at 4/10/2021 0609  Last data filed at 4/9/2021 2218  Gross per 24 hour   Intake    Output 300 ml   Net -300 ml     No intake/output data recorded.     Laboratory Tests:  Lab Results   Component Value Date    CREATININE 1.3 (H) 04/10/2021    BUN 21 04/10/2021     04/10/2021    K 4.2 04/10/2021     04/10/2021    CO2 27 04/10/2021     Recent Labs     04/09/21  1337 04/09/21  1800 04/10/21  0319   TROPONINI <0.01 <0.01 <0.01     Lab Results   Component Value Date    PROBNP 125 04/09/2021     Lab Results   Component Value Date    WBC 4.4 04/10/2021    RBC 4.51 04/10/2021    HGB 13.7 04/10/2021    HCT 42.5 04/10/2021    MCV 94.2 04/10/2021    MCH 30.4 04/10/2021    MCHC 32.2 04/10/2021    RDW 14.7 04/10/2021     04/10/2021    MPV 9.6 04/10/2021     Recent Labs     04/09/21  1337 04/10/21  0319   ALKPHOS 108 100   ALT 30 28   AST 32 28   PROT 7.6 7.1   BILITOT 0.5 0.7   LABALBU 4.0 3.8     Lab Results   Component Value Date    MG 1.9 11/27/2017     Lab Results   Component Value Date    PROTIME 12.4 04/09/2021    INR 1.2 04/09/2021     Lab Results   Component Value Date    TSH 1.980 08/13/2020     No components found for: CHLPL  Lab Results   Component Value Date    TRIG 198 (H) 04/10/2021    TRIG 143 08/13/2020    TRIG 240 01/08/2019     Lab Results   Component Value Date    HDL 23 04/10/2021    HDL 26 08/13/2020    HDL 21 (A) 01/08/2019     Lab Results   Component Value Date    LDLCALC 73 04/10/2021    LDLCALC 74 08/13/2020    [address] 01/08/2019           Active Hospital Problems    Diagnosis    Chest pain [R07.9]    Old myocardial infarct [I25.2]    Benign hypertension [I10]    Right hemiparesis (HCC) [G81.91]             ASSESSMENT / PLAN:  1. Chest pain at rest without ischemic EKG changes and normal cardiac markers. Will obtain pharmacologic stress test today. Right-sided hemiplegia precludes treadmill study. Continue baby aspirin, statin and amlodipine    2. Previous nuclear stress test showing fixed anterior defect with mild left ventricular dysfunction/EF 48%.     3. Hypertension controlled          Electronically signed by Yun Almanzar MD on 4/10/2021 at 6:09 AM

## 2021-04-10 NOTE — PROGRESS NOTES
Pharm stress completed with RN, nuclear tech, and physician wearing masks.   Patient removed mask for breathing purposes during test.

## 2021-04-10 NOTE — H&P
Chacha Vega MD FACP   History and Physical      CHIEF COMPLAINT: Shortness of breath      HISTORY OF PRESENT ILLNESS:    70-year-old man with previous traumatic brain injury  Patient was seen in stress lab earlier this morning  Data reviewed in detail  He presented to emergency room with few days history of anterior chest heaviness describes as a shortness of breath on minimal exertion  No hemoptysis diaphoresis  Chronically had lower extremity edema  Initial work-up was negative  Admitted for further management    Past Medical History:    Past Medical History:   Diagnosis Date    Bleeding tendency ([address]     Diabetes mellitus ([address]     DVT (deep venous thrombosis) ([address]     subclavian vein    Gout     Hydrocephalus ([address]     Hypertension     Hypothyroidism     Intracranial bleed ([address]     Left knee pain 7/31/2018    Pain of right side of body     RADIATES TO RT LEG    Tingling     SOMRTIMES RT HAND & LEG    Traumatic brain injury ([address] 2008    GAS WELL EXPLOSION WITH  TRAUMATIC BRAIN INJURY AND RIGHTHEMIPARESIS     Urination decrease     Visual disturbances     Weakness of right side of body        Past Surgical History:    Past Surgical History:   Procedure Laterality Date    BACLOFEN PUMP IMPLANTATION  07/2015    BRAIN SURGERY       SHUNT JULY 2008    FRACTURE SURGERY      RIGHT ARM WITH WINTER PLACEMENT    HIP SURGERY      HETEROTOPIC OSSIFICATION RIGHT HIP    JOINT REPLACEMENT      RIGHT KNEE ARTHROSCOPIC    KIDNEY STONE SURGERY      PARTIAL NEPHRECTOMY      VENTRICULOPERITONEAL SHUNT  2008       Medications Prior to Admission:    Medications Prior to Admission: allopurinol (ZYLOPRIM) 100 MG tablet, Take 200 mg by mouth daily  amLODIPine (NORVASC) 5 MG tablet, Take 1 tablet by mouth daily  citalopram (CELEXA) 20 MG tablet, Take 1 tablet by mouth daily  levothyroxine (SYNTHROID) 150 MCG tablet, Take 1 tablet by mouth Daily  MAGNESIUM OXIDE PO, Take 40 mg by mouth daily   tamsulosin (FLOMAX) 0.4 MG capsule, Take 0.4 mg by mouth nightly   Calcium Carbonate-Vitamin D (OYSTER SHELL CALCIUM/D) 500-200 MG-UNIT TABS, Take 1 tablet by mouth 2 times daily   Misc Natural Products (OSTEO BI-FLEX JOINT SHIELD PO), Take 1 tablet by mouth daily   potassium citrate (UROCIT-K) 10 MEQ (1080 MG) extended release tablet, Take 10 mEq by mouth 2 times daily   BACLOFEN, PAIN PUMP REFILL CHARGE,, 2.83 mcg by Implant route continuous   gabapentin (NEURONTIN) 100 MG capsule, Take 100 mg by mouth 2 times daily  aspirin 81 MG tablet, Take 81 mg by mouth nightly   therapeutic multivitamin-minerals (THERAGRAN-M) tablet, Take 1 tablet by mouth daily     Allergies:    Septra [bactrim] and Sulfamethoxazole-trimethoprim    Social History:    reports that he has never smoked. He has never used smokeless tobacco. He reports that he does not drink alcohol or use drugs. Family History:   family history includes COPD in his mother; Cancer in his brother; Diabetes in his mother; Other in his father. REVIEW OF SYSTEMS:  As above in the HPI, otherwise negative    PHYSICAL EXAM:    Vitals:  /70   Pulse 58   Temp 97.5 °F (36.4 °C) (Tympanic)   Resp 16   Ht 6' (1.829 m)   Wt 252 lb (114.3 kg)   SpO2 96%   BMI 34.18 kg/m²     General:  Awake, alert, oriented X 3. Well developed, well nourished, well groomed. No apparent distress. HEENT:  Normocephalic, atraumatic. Pupils equal, round, reactive to light. No scleral icterus. No conjunctival injection. .  No nasal discharge. Neck:  Supple  Heart:  RRR, no murmurs, gallops, rubs  Lungs:  CTA bilaterally, bilat symmetrical expansion, no wheeze, rales, or rhonchi  Abdomen: Bowel sounds present, soft, nontender, no masses, no organomegaly, no peritoneal signs  Extremities:  No clubbing, cyanosis,   Bilateral lower extremity edema noted  Skin:  Warm and dry, no open lesions   Scattered erythema over the lower extremities noted  Neuro:  Old right hemiparesis noted

## 2021-04-11 VITALS
OXYGEN SATURATION: 92 % | HEART RATE: 73 BPM | WEIGHT: 244.1 LBS | TEMPERATURE: 98.8 F | RESPIRATION RATE: 18 BRPM | BODY MASS INDEX: 33.06 KG/M2 | HEIGHT: 72 IN | SYSTOLIC BLOOD PRESSURE: 129 MMHG | DIASTOLIC BLOOD PRESSURE: 69 MMHG

## 2021-04-11 LAB
ALBUMIN SERPL-MCNC: 4.2 G/DL (ref 3.5–5.2)
ALP BLD-CCNC: 110 U/L (ref 40–129)
ALT SERPL-CCNC: 31 U/L (ref 0–40)
ANION GAP SERPL CALCULATED.3IONS-SCNC: 10 MMOL/L (ref 7–16)
AST SERPL-CCNC: 34 U/L (ref 0–39)
BILIRUB SERPL-MCNC: 0.9 MG/DL (ref 0–1.2)
BUN BLDV-MCNC: 20 MG/DL (ref 8–23)
CALCIUM SERPL-MCNC: 9.7 MG/DL (ref 8.6–10.2)
CHLORIDE BLD-SCNC: 105 MMOL/L (ref 98–107)
CO2: 25 MMOL/L (ref 22–29)
CREAT SERPL-MCNC: 1.4 MG/DL (ref 0.7–1.2)
GFR AFRICAN AMERICAN: >60
GFR NON-AFRICAN AMERICAN: 51 ML/MIN/1.73
GLUCOSE BLD-MCNC: 98 MG/DL (ref 74–99)
HCT VFR BLD CALC: 45.6 % (ref 37–54)
HEMOGLOBIN: 14.8 G/DL (ref 12.5–16.5)
MCH RBC QN AUTO: 30.6 PG (ref 26–35)
MCHC RBC AUTO-ENTMCNC: 32.5 % (ref 32–34.5)
MCV RBC AUTO: 94.2 FL (ref 80–99.9)
PDW BLD-RTO: 14.8 FL (ref 11.5–15)
PLATELET # BLD: 150 E9/L (ref 130–450)
PMV BLD AUTO: 9.7 FL (ref 7–12)
POTASSIUM REFLEX MAGNESIUM: 4.3 MMOL/L (ref 3.5–5)
RBC # BLD: 4.84 E12/L (ref 3.8–5.8)
SODIUM BLD-SCNC: 140 MMOL/L (ref 132–146)
TOTAL PROTEIN: 7.8 G/DL (ref 6.4–8.3)
WBC # BLD: 4.7 E9/L (ref 4.5–11.5)

## 2021-04-11 PROCEDURE — 85027 COMPLETE CBC AUTOMATED: CPT

## 2021-04-11 PROCEDURE — 6370000000 HC RX 637 (ALT 250 FOR IP): Performed by: INTERNAL MEDICINE

## 2021-04-11 PROCEDURE — 6360000002 HC RX W HCPCS: Performed by: INTERNAL MEDICINE

## 2021-04-11 PROCEDURE — 80053 COMPREHEN METABOLIC PANEL: CPT

## 2021-04-11 PROCEDURE — 96372 THER/PROPH/DIAG INJ SC/IM: CPT

## 2021-04-11 PROCEDURE — 36415 COLL VENOUS BLD VENIPUNCTURE: CPT

## 2021-04-11 PROCEDURE — G0378 HOSPITAL OBSERVATION PER HR: HCPCS

## 2021-04-11 RX ORDER — METOPROLOL SUCCINATE 25 MG/1
25 TABLET, EXTENDED RELEASE ORAL DAILY
Status: DISCONTINUED | OUTPATIENT
Start: 2021-04-11 | End: 2021-04-11 | Stop reason: HOSPADM

## 2021-04-11 RX ORDER — NITROGLYCERIN 0.4 MG/1
0.4 TABLET SUBLINGUAL EVERY 5 MIN PRN
Status: DISCONTINUED | OUTPATIENT
Start: 2021-04-11 | End: 2021-04-11 | Stop reason: HOSPADM

## 2021-04-11 RX ORDER — METOPROLOL SUCCINATE 25 MG/1
25 TABLET, EXTENDED RELEASE ORAL DAILY
Qty: 30 TABLET | Refills: 3 | Status: SHIPPED | OUTPATIENT
Start: 2021-04-12

## 2021-04-11 RX ORDER — ATORVASTATIN CALCIUM 40 MG/1
40 TABLET, FILM COATED ORAL NIGHTLY
Qty: 30 TABLET | Refills: 3 | Status: SHIPPED | OUTPATIENT
Start: 2021-04-11

## 2021-04-11 RX ORDER — NITROGLYCERIN 0.4 MG/1
TABLET SUBLINGUAL
Qty: 25 TABLET | Refills: 3 | Status: SHIPPED | OUTPATIENT
Start: 2021-04-11

## 2021-04-11 RX ADMIN — Medication 1 TABLET: at 08:57

## 2021-04-11 RX ADMIN — TRIAMCINOLONE ACETONIDE: 1 CREAM TOPICAL at 08:58

## 2021-04-11 RX ADMIN — ASPIRIN 81 MG CHEWABLE TABLET 81 MG: 81 TABLET CHEWABLE at 08:57

## 2021-04-11 RX ADMIN — CALCIUM CARBONATE-VITAMIN D TAB 500 MG-200 UNIT 1 TABLET: 500-200 TAB at 08:58

## 2021-04-11 RX ADMIN — LEVOTHYROXINE SODIUM 150 MCG: 75 TABLET ORAL at 06:01

## 2021-04-11 RX ADMIN — GABAPENTIN 100 MG: 100 CAPSULE ORAL at 08:58

## 2021-04-11 RX ADMIN — TAMSULOSIN HYDROCHLORIDE 0.4 MG: 0.4 CAPSULE ORAL at 09:02

## 2021-04-11 RX ADMIN — ENOXAPARIN SODIUM 40 MG: 40 INJECTION SUBCUTANEOUS at 08:57

## 2021-04-11 RX ADMIN — AMLODIPINE BESYLATE 5 MG: 5 TABLET ORAL at 08:58

## 2021-04-11 RX ADMIN — ALLOPURINOL 200 MG: 100 TABLET ORAL at 08:58

## 2021-04-11 RX ADMIN — CITALOPRAM HYDROBROMIDE 20 MG: 20 TABLET ORAL at 08:58

## 2021-04-11 RX ADMIN — METOPROLOL SUCCINATE 25 MG: 25 TABLET, FILM COATED, EXTENDED RELEASE ORAL at 09:00

## 2021-04-11 NOTE — PROGRESS NOTES
INPATIENT CARDIOLOGY PROGRESS NOTE    Name: Galo Yanez    Age: 61 y.o. Date of Admission: 4/9/2021 12:50 PM    Date of Service: 4/11/2021    Reason for Consultation: Chest pain and shortness of breath    Chief Complaint: Shortness of breath and chest pain    Referring Physician: Dr. Mikhail Colbert    History of Present Illness: The patient is a 61y.o. year old male who has right-sided hemiplegia 13 years following intracranial hemorrhage. Developed shortness of breath and vague mid sternal chest discomfort at rest, that does not radiate and is not associated with nausea, vomiting or diaphoresis. He is nonambulatory. Denies palpitations, syncope or near syncope. No PND/orthopnea. Nuclear stress test from 2016 showed fixed distal anterior defect with anteroapical hypokinesia, LVEF 48%. Chest x-ray, EKG, telemetry independently reviewed. Chest x-ray: No vascular congestion, infiltrates or effusions. EKG: Sinus rhythm with poor R wave progression, possible old septal infarction. Telemetry: Sinus rhythm without heart block or pauses. Troponin less than 0.01x2    4/11/2021 interim history  No recurrent chest pain since admission. Resting comfortably without complaints this morning  Telemetry: Sinus rhythm  Lexiscan stress test result as below.       Past Medical History:   Past Medical History:   Diagnosis Date    Bleeding tendency ([address]     Diabetes mellitus ([address]     DVT (deep venous thrombosis) (HCC)     subclavian vein    Gout     Hydrocephalus (HCC)     Hypertension     Hypothyroidism     Intracranial bleed ([address]     Left knee pain 7/31/2018    Pain of right side of body     RADIATES TO RT LEG    Tingling     SOMRTIMES RT HAND & LEG    Traumatic brain injury ([address] 2008    GAS WELL EXPLOSION WITH  TRAUMATIC BRAIN INJURY AND RIGHTHEMIPARESIS     Urination decrease     Visual disturbances     Weakness of right side of body        Past Surgical History:  Past Surgical History:  Septra [Bactrim]      SEPTRA DS REDNESS HIVES AND HARD TIME BREATHING    Sulfamethoxazole-Trimethoprim Rash       Home Meds:  Prior to Admission medications    Medication Sig Start Date End Date Taking?  Authorizing Provider   allopurinol (ZYLOPRIM) 100 MG tablet Take 200 mg by mouth daily   Yes Historical Provider, MD   amLODIPine (NORVASC) 5 MG tablet Take 1 tablet by mouth daily 6/25/20  Yes Jorge Espinoza PA-C   citalopram (CELEXA) 20 MG tablet Take 1 tablet by mouth daily 6/25/20  Yes Jorge Espinoza PA-C   levothyroxine (SYNTHROID) 150 MCG tablet Take 1 tablet by mouth Daily 6/25/20  Yes Jorge Espinoza PA-C   MAGNESIUM OXIDE PO Take 40 mg by mouth daily    Yes Historical Provider, MD   tamsulosin (FLOMAX) 0.4 MG capsule Take 0.4 mg by mouth nightly  4/12/18  Yes Historical Provider, MD   Calcium Carbonate-Vitamin D (OYSTER SHELL CALCIUM/D) 500-200 MG-UNIT TABS Take 1 tablet by mouth 2 times daily    Yes Historical Provider, MD   Misc Natural Products (OSTEO BI-FLEX JOINT SHIELD PO) Take 1 tablet by mouth daily    Yes Historical Provider, MD   potassium citrate (UROCIT-K) 10 MEQ (1080 MG) extended release tablet Take 10 mEq by mouth 2 times daily  1/15/17  Yes Historical Provider, MD   BACLOFEN, PAIN PUMP REFILL CHARGE, 2.83 mcg by Implant route continuous    Yes Historical Provider, MD   gabapentin (NEURONTIN) 100 MG capsule Take 100 mg by mouth 2 times daily   Yes Historical Provider, MD   aspirin 81 MG tablet Take 81 mg by mouth nightly    Yes Historical Provider, MD   therapeutic multivitamin-minerals (THERAGRAN-M) tablet Take 1 tablet by mouth daily    Yes Historical Provider, MD       Current Medications:  Current Facility-Administered Medications   Medication Dose Route Frequency Provider Last Rate Last Admin    therapeutic multivitamin-minerals 1 tablet  1 tablet Oral Daily Felipe Smith DO   1 tablet at 04/11/21 0857    gabapentin (NEURONTIN) capsule 100 mg  100 mg Oral BID Felipe Smith DO oz (110.7 kg)   SpO2 95%   BMI 33.11 kg/m²   Weight change: -5 lb 14.4 oz (-2.676 kg)  Wt Readings from Last 3 Encounters:   04/11/21 244 lb 1.6 oz (110.7 kg)   03/13/21 250 lb (113.4 kg)   09/18/18 250 lb (113.4 kg)       General: Awake, alert, oriented x3, no acute distress    HEENT: Normocephalic and atraumatic, pupils equal and round. .  Neck: No JVD or bruits. Cardiac: Regular rate and rhythm, normal S1 and S2, no S3. Apical impulse is normal.  No murmurs, rubs or clicks. No carotid bruits and no abdominal bruits. No peripheral edema, cyanosis or clubbing. Normal pulses in the upper and lower extremities bilaterally. Resp: Unlabored respirations at rest.  No wheezes, rales or rhonchi. Abdomen: soft, nontender, nondistended,     Skin: Warm and dry, no cyanosis. Neuro: Right-sided hemiplegia. Cranial nerves intact.     Psych: Cooperative, and normal affect    Intake/Output:    Intake/Output Summary (Last 24 hours) at 4/11/2021 1008  Last data filed at 4/11/2021 0826  Gross per 24 hour   Intake    Output 1125 ml   Net -1125 ml     I/O this shift:  In: -   Out: 200 [Urine:200]    Laboratory Tests:  Lab Results   Component Value Date    CREATININE 1.4 (H) 04/11/2021    BUN 20 04/11/2021     04/11/2021    K 4.3 04/11/2021     04/11/2021    CO2 25 04/11/2021     Recent Labs     04/09/21  1337 04/09/21  1800 04/10/21  0319   TROPONINI <0.01 <0.01 <0.01     Lab Results   Component Value Date    PROBNP 125 04/09/2021     Lab Results   Component Value Date    WBC 4.7 04/11/2021    RBC 4.84 04/11/2021    HGB 14.8 04/11/2021    HCT 45.6 04/11/2021    MCV 94.2 04/11/2021    MCH 30.6 04/11/2021    MCHC 32.5 04/11/2021    RDW 14.8 04/11/2021     04/11/2021    MPV 9.7 04/11/2021     Recent Labs     04/09/21  1337 04/10/21  0319 04/11/21  0846   ALKPHOS 108 100 110   ALT 30 28 31   AST 32 28 34   PROT 7.6 7.1 7.8   BILITOT 0.5 0.7 0.9   LABALBU 4.0 3.8 4.2     Lab Results   Component Value Date    MG 1.9 11/27/2017     Lab Results   Component Value Date    PROTIME 12.4 04/09/2021    INR 1.2 04/09/2021     Lab Results   Component Value Date    TSH 1.980 08/13/2020     No components found for: CHLPL  Lab Results   Component Value Date    TRIG 198 (H) 04/10/2021    TRIG 143 08/13/2020    TRIG 240 01/08/2019     Lab Results   Component Value Date    HDL 23 04/10/2021    HDL 26 08/13/2020    HDL 21 (A) 01/08/2019     Lab Results   Component Value Date    LDLCALC 73 04/10/2021    [address] 08/13/2020 [address] 01/08/2019         The myocardial perfusion imaging was abnormal.       There is a large-sized, severe intensity predominantly fixed perfusion   defect of the anterior septum extending to the apex.        Mild gerald-infarct reversible ischemia is noted on resting imaging.       Overall left ventricular systolic function was normal without regional   wall motion abnormality. Active Hospital Problems    Diagnosis    Unstable angina ([address] [I20.0]    Chest pain [R07.9]    Old myocardial infarct [I25.2]    Benign hypertension [I10]    Right hemiparesis ([address] [G81.91]             ASSESSMENT / PLAN:  1. Chest pain at rest without ischemic EKG changes and normal cardiac markers. Nuclear stress test confirms old anterior infarct that was evident in 2016, minimal greald-infarct ischemia, normal EF. Long discussion with patient regarding conservative medical therapy versus an invasive strategy. He prefers the former. Add metoprolol XL 25 mg daily and as needed nitroglycerin. Continue baby aspirin, statin, amlodipine as currently ordered. 2. Hypertension controlled    Okay for discharge and office will arrange follow-up appointment.           Electronically signed by Dennis Bond MD on 4/11/2021 at 10:08 AM

## 2021-11-02 ENCOUNTER — HOSPITAL ENCOUNTER (INPATIENT)
Age: 66
LOS: 1 days | Discharge: HOME | DRG: 313 | End: 2021-11-03
Attending: EMERGENCY MEDICINE | Admitting: INTERNAL MEDICINE
Payer: MEDICARE

## 2021-11-02 ENCOUNTER — APPOINTMENT (OUTPATIENT)
Dept: GENERAL RADIOLOGY | Age: 66
DRG: 313 | End: 2021-11-02
Payer: MEDICARE

## 2021-11-02 DIAGNOSIS — R07.9 CHEST PAIN, UNSPECIFIED TYPE: Primary | ICD-10-CM

## 2021-11-02 PROBLEM — S06.5XAA SUBDURAL HEMATOMA: Status: RESOLVED | Noted: 2017-03-01 | Resolved: 2021-11-02

## 2021-11-02 PROBLEM — I60.9 SUBARACHNOID HEMORRHAGE (HCC): Status: RESOLVED | Noted: 2017-03-01 | Resolved: 2021-11-02

## 2021-11-02 PROBLEM — R07.89 ATYPICAL CHEST PAIN: Status: ACTIVE | Noted: 2021-11-02

## 2021-11-02 LAB
ALBUMIN SERPL-MCNC: 3.9 G/DL (ref 3.5–5.2)
ALP BLD-CCNC: 126 U/L (ref 40–129)
ALT SERPL-CCNC: 49 U/L (ref 0–40)
ANION GAP SERPL CALCULATED.3IONS-SCNC: 10 MMOL/L (ref 7–16)
AST SERPL-CCNC: 47 U/L (ref 0–39)
BASOPHILS ABSOLUTE: 0.03 E9/L (ref 0–0.2)
BASOPHILS RELATIVE PERCENT: 0.6 % (ref 0–2)
BILIRUB SERPL-MCNC: 0.6 MG/DL (ref 0–1.2)
BUN BLDV-MCNC: 26 MG/DL (ref 6–23)
CALCIUM SERPL-MCNC: 9.7 MG/DL (ref 8.6–10.2)
CHLORIDE BLD-SCNC: 103 MMOL/L (ref 98–107)
CO2: 27 MMOL/L (ref 22–29)
CREAT SERPL-MCNC: 1.3 MG/DL (ref 0.7–1.2)
D DIMER: 217 NG/ML DDU
EOSINOPHILS ABSOLUTE: 0.19 E9/L (ref 0.05–0.5)
EOSINOPHILS RELATIVE PERCENT: 3.7 % (ref 0–6)
GFR AFRICAN AMERICAN: >60
GFR NON-AFRICAN AMERICAN: 55 ML/MIN/1.73
GLUCOSE BLD-MCNC: 138 MG/DL (ref 74–99)
HCT VFR BLD CALC: 41.8 % (ref 37–54)
HEMOGLOBIN: 13.8 G/DL (ref 12.5–16.5)
IMMATURE GRANULOCYTES #: 0.02 E9/L
IMMATURE GRANULOCYTES %: 0.4 % (ref 0–5)
LYMPHOCYTES ABSOLUTE: 1.21 E9/L (ref 1.5–4)
LYMPHOCYTES RELATIVE PERCENT: 23.7 % (ref 20–42)
MCH RBC QN AUTO: 31 PG (ref 26–35)
MCHC RBC AUTO-ENTMCNC: 33 % (ref 32–34.5)
MCV RBC AUTO: 93.9 FL (ref 80–99.9)
MONOCYTES ABSOLUTE: 0.48 E9/L (ref 0.1–0.95)
MONOCYTES RELATIVE PERCENT: 9.4 % (ref 2–12)
NEUTROPHILS ABSOLUTE: 3.18 E9/L (ref 1.8–7.3)
NEUTROPHILS RELATIVE PERCENT: 62.2 % (ref 43–80)
PDW BLD-RTO: 15.9 FL (ref 11.5–15)
PLATELET # BLD: 141 E9/L (ref 130–450)
PMV BLD AUTO: 9.7 FL (ref 7–12)
POTASSIUM REFLEX MAGNESIUM: 4.5 MMOL/L (ref 3.5–5)
PRO-BNP: 164 PG/ML (ref 0–125)
RBC # BLD: 4.45 E12/L (ref 3.8–5.8)
REASON FOR REJECTION: NORMAL
REASON FOR REJECTION: NORMAL
REJECTED TEST: NORMAL
REJECTED TEST: NORMAL
SODIUM BLD-SCNC: 140 MMOL/L (ref 132–146)
TOTAL PROTEIN: 7.5 G/DL (ref 6.4–8.3)
TROPONIN, HIGH SENSITIVITY: 14 NG/L (ref 0–11)
TROPONIN, HIGH SENSITIVITY: 15 NG/L (ref 0–11)
TROPONIN, HIGH SENSITIVITY: 16 NG/L (ref 0–11)
WBC # BLD: 5.1 E9/L (ref 4.5–11.5)

## 2021-11-02 PROCEDURE — G0378 HOSPITAL OBSERVATION PER HR: HCPCS

## 2021-11-02 PROCEDURE — 1200000000 HC SEMI PRIVATE

## 2021-11-02 PROCEDURE — 71045 X-RAY EXAM CHEST 1 VIEW: CPT

## 2021-11-02 PROCEDURE — 85378 FIBRIN DEGRADE SEMIQUANT: CPT

## 2021-11-02 PROCEDURE — 83880 ASSAY OF NATRIURETIC PEPTIDE: CPT

## 2021-11-02 PROCEDURE — 80053 COMPREHEN METABOLIC PANEL: CPT

## 2021-11-02 PROCEDURE — 84484 ASSAY OF TROPONIN QUANT: CPT

## 2021-11-02 PROCEDURE — 85025 COMPLETE CBC W/AUTO DIFF WBC: CPT

## 2021-11-02 PROCEDURE — 36415 COLL VENOUS BLD VENIPUNCTURE: CPT

## 2021-11-02 PROCEDURE — 93005 ELECTROCARDIOGRAM TRACING: CPT | Performed by: EMERGENCY MEDICINE

## 2021-11-02 PROCEDURE — 99283 EMERGENCY DEPT VISIT LOW MDM: CPT

## 2021-11-02 RX ORDER — LEVOTHYROXINE SODIUM 0.07 MG/1
150 TABLET ORAL DAILY
Status: DISCONTINUED | OUTPATIENT
Start: 2021-11-03 | End: 2021-11-03 | Stop reason: HOSPADM

## 2021-11-02 RX ORDER — AMLODIPINE BESYLATE 5 MG/1
5 TABLET ORAL DAILY
Status: DISCONTINUED | OUTPATIENT
Start: 2021-11-02 | End: 2021-11-03 | Stop reason: HOSPADM

## 2021-11-02 RX ORDER — ATORVASTATIN CALCIUM 40 MG/1
40 TABLET, FILM COATED ORAL NIGHTLY
Status: DISCONTINUED | OUTPATIENT
Start: 2021-11-02 | End: 2021-11-03 | Stop reason: HOSPADM

## 2021-11-02 RX ORDER — M-VIT,TX,IRON,MINS/CALC/FOLIC 27MG-0.4MG
1 TABLET ORAL DAILY
Status: DISCONTINUED | OUTPATIENT
Start: 2021-11-02 | End: 2021-11-03 | Stop reason: HOSPADM

## 2021-11-02 RX ORDER — POTASSIUM CITRATE 5 MEQ/1
10 TABLET, EXTENDED RELEASE ORAL 2 TIMES DAILY
Status: DISCONTINUED | OUTPATIENT
Start: 2021-11-02 | End: 2021-11-03 | Stop reason: HOSPADM

## 2021-11-02 RX ORDER — ACETAMINOPHEN 325 MG/1
650 TABLET ORAL EVERY 6 HOURS PRN
Status: DISCONTINUED | OUTPATIENT
Start: 2021-11-02 | End: 2021-11-03 | Stop reason: HOSPADM

## 2021-11-02 RX ORDER — ONDANSETRON 2 MG/ML
4 INJECTION INTRAMUSCULAR; INTRAVENOUS EVERY 6 HOURS PRN
Status: DISCONTINUED | OUTPATIENT
Start: 2021-11-02 | End: 2021-11-03 | Stop reason: HOSPADM

## 2021-11-02 RX ORDER — METOPROLOL SUCCINATE 25 MG/1
25 TABLET, EXTENDED RELEASE ORAL DAILY
Status: DISCONTINUED | OUTPATIENT
Start: 2021-11-03 | End: 2021-11-03 | Stop reason: HOSPADM

## 2021-11-02 RX ORDER — ASPIRIN 81 MG/1
81 TABLET, CHEWABLE ORAL DAILY
Status: DISCONTINUED | OUTPATIENT
Start: 2021-11-03 | End: 2021-11-03 | Stop reason: HOSPADM

## 2021-11-02 RX ORDER — TAMSULOSIN HYDROCHLORIDE 0.4 MG/1
0.4 CAPSULE ORAL NIGHTLY
Status: DISCONTINUED | OUTPATIENT
Start: 2021-11-02 | End: 2021-11-03 | Stop reason: HOSPADM

## 2021-11-02 RX ORDER — ALLOPURINOL 100 MG/1
200 TABLET ORAL DAILY
Status: DISCONTINUED | OUTPATIENT
Start: 2021-11-02 | End: 2021-11-03 | Stop reason: HOSPADM

## 2021-11-02 RX ORDER — GABAPENTIN 100 MG/1
100 CAPSULE ORAL 2 TIMES DAILY
Status: DISCONTINUED | OUTPATIENT
Start: 2021-11-02 | End: 2021-11-03 | Stop reason: HOSPADM

## 2021-11-02 RX ORDER — ACETAMINOPHEN 650 MG/1
650 SUPPOSITORY RECTAL EVERY 6 HOURS PRN
Status: DISCONTINUED | OUTPATIENT
Start: 2021-11-02 | End: 2021-11-03 | Stop reason: HOSPADM

## 2021-11-02 RX ORDER — CITALOPRAM 20 MG/1
20 TABLET ORAL DAILY
Status: DISCONTINUED | OUTPATIENT
Start: 2021-11-02 | End: 2021-11-03 | Stop reason: HOSPADM

## 2021-11-02 RX ORDER — ONDANSETRON 4 MG/1
4 TABLET, ORALLY DISINTEGRATING ORAL EVERY 8 HOURS PRN
Status: DISCONTINUED | OUTPATIENT
Start: 2021-11-02 | End: 2021-11-03 | Stop reason: HOSPADM

## 2021-11-02 ASSESSMENT — ENCOUNTER SYMPTOMS
STRIDOR: 0
DIARRHEA: 0
EYE PAIN: 0
SORE THROAT: 0
WHEEZING: 0
ABDOMINAL PAIN: 0
NAUSEA: 0
APNEA: 0
SHORTNESS OF BREATH: 1
VOMITING: 0
SINUS PAIN: 0
COUGH: 0
RHINORRHEA: 0
BACK PAIN: 0

## 2021-11-02 ASSESSMENT — PAIN SCALES - GENERAL: PAINLEVEL_OUTOF10: 3

## 2021-11-02 ASSESSMENT — PAIN DESCRIPTION - LOCATION: LOCATION: CHEST

## 2021-11-02 ASSESSMENT — PAIN DESCRIPTION - DESCRIPTORS: DESCRIPTORS: TIGHTNESS

## 2021-11-02 ASSESSMENT — PAIN DESCRIPTION - ORIENTATION: ORIENTATION: MID

## 2021-11-02 NOTE — ED PROVIDER NOTES
71-year-old male who presents today to the emergency department with complaints of substernal chest pain onset last night. He describes the pain as a pressure sensation that radiates to his left shoulder and is moderate in severity. Pain is aggravated with exertion and better with rest. He reports associated shortness of breath that is also worse with exertion. He denies any pleuritic symptoms. He did have similar symptoms to this in April 2021 and was admitted to the hospital and evaluated by cardiology. He was started on metoprolol and nitroglycerin for unstable angina and has been taking his metoprolol as prescribed. He denies any fevers, chills, nausea, vomiting, cough or abdominal pain. He does not have any sick contacts at home. He has never had any syncopal episodes or palpitations. Additionally he is a hemiplegic and uses a wheelchair. Review of Systems   Constitutional: Negative for chills, diaphoresis and fever. HENT: Negative for congestion, ear pain, rhinorrhea, sinus pain and sore throat. Eyes: Negative for pain. Respiratory: Positive for shortness of breath. Negative for apnea, cough, wheezing and stridor. Cardiovascular: Positive for chest pain. Negative for palpitations and leg swelling. Gastrointestinal: Negative for abdominal pain, diarrhea, nausea and vomiting. Genitourinary: Negative for dysuria, flank pain and frequency. Musculoskeletal: Negative for back pain, neck pain and neck stiffness. Neurological: Negative for dizziness, syncope, weakness, light-headedness and headaches. Psychiatric/Behavioral: Negative for agitation and confusion. Physical Exam  Vitals reviewed. Constitutional:       General: He is not in acute distress. Appearance: Normal appearance. He is not toxic-appearing. HENT:      Head: Normocephalic and atraumatic. Nose: No congestion or rhinorrhea. Eyes:      General: No scleral icterus. Right eye: No discharge. Left eye: No discharge. Extraocular Movements: Extraocular movements intact. Pupils: Pupils are equal, round, and reactive to light. Cardiovascular:      Rate and Rhythm: Normal rate and regular rhythm. Heart sounds: Normal heart sounds. No murmur heard. No friction rub. No gallop. Pulmonary:      Effort: Pulmonary effort is normal. No respiratory distress. Breath sounds: No wheezing, rhonchi or rales. Chest:      Chest wall: No mass, deformity, tenderness or crepitus. Abdominal:      General: Abdomen is flat. There is no distension. Tenderness: There is no abdominal tenderness. Musculoskeletal:         General: No deformity or signs of injury. Cervical back: Normal range of motion and neck supple. No rigidity or tenderness. Right lower leg: No edema. Left lower leg: No edema. Skin:     General: Skin is warm and dry. Coloration: Skin is not jaundiced or pale. Neurological:      General: No focal deficit present. Mental Status: He is alert. Psychiatric:         Mood and Affect: Mood normal.          Procedures     MDM  Number of Diagnoses or Management Options  Chest pain, unspecified type  Diagnosis management comments: This is a 81-year-old male who presents today to the emergency department with his wife with complaints of substernal chest pressure onset last night and associated with shortness of breath that is worse with exertion. He had similar symptoms in April and was admitted to the hospital for cardiology evaluation at that time. He was found to have hypokinesis of his left heart and had a left ventricular ejection fraction of 45%. Patient was started on metoprolol and nitroglycerin for unstable angina. He has been taking medications as prescribed. On arrival to the emergency department he is nontoxic, in no acute distress. All vital signs reviewed and within normal limits. EKG obtained and shows no ST segment elevations.  Compared to previous EKG no changes were found. On exam he did not have any chest tenderness on palpation of his chest wall. Lungs are clear to auscultation with no wheezing. Chest x-ray shows no acute cardiopulmonary abnormalities. D-Dimer was WNL. Pt was admitted to Dr. Tiffanie Frances for further management of atypical CP. Discussed admission with pt and his wife. He remains stable during his encounter in the department. ED Course as of Nov 02 1956 Tue Nov 02, 2021 1724 Reevaluated the patient and informed him of lab findings. [NS]      ED Course User Index  [NS] Britni Garcia DO        ED Course as of Nov 02 2308 Tue Nov 02, 2021 1724 Reevaluated the patient and informed him of lab findings. [NS]      ED Course User Index  [NS] Britni Garcia DO       --------------------------------------------- PAST HISTORY ---------------------------------------------  Past Medical History:  has a past medical history of Bleeding tendency ([address] Diabetes mellitus ([address] DVT (deep venous thrombosis) ([address] Gout, Hydrocephalus ([address] Hypertension, Hypothyroidism, Intracranial bleed ([address] Left knee pain, Pain of right side of body, Tingling, Traumatic brain injury ([address] Urination decrease, Visual disturbances, and Weakness of right side of body. Past Surgical History:  has a past surgical history that includes brain surgery; Kidney stone surgery; fracture surgery; joint replacement; hip surgery; partial nephrectomy; baclofen pump implantation (07/2015); and Ventriculoperitoneal shunt (2008). Social History:  reports that he has never smoked. He has never used smokeless tobacco. He reports that he does not drink alcohol and does not use drugs. Family History: family history includes COPD in his mother; Cancer in his brother; Diabetes in his mother; Other in his father. The patients home medications have been reviewed.     Allergies: Septra [bactrim] and Sulfamethoxazole-trimethoprim    -------------------------------------------------- RESULTS -------------------------------------------------    LABS:  Results for orders placed or performed during the hospital encounter of 11/02/21   CBC Auto Differential   Result Value Ref Range    WBC 5.1 4.5 - 11.5 E9/L    RBC 4.45 3.80 - 5.80 E12/L    Hemoglobin 13.8 12.5 - 16.5 g/dL    Hematocrit 41.8 37.0 - 54.0 %    MCV 93.9 80.0 - 99.9 fL    MCH 31.0 26.0 - 35.0 pg    MCHC 33.0 32.0 - 34.5 %    RDW 15.9 (H) 11.5 - 15.0 fL    Platelets 133 771 - 181 E9/L    MPV 9.7 7.0 - 12.0 fL    Neutrophils % 62.2 43.0 - 80.0 %    Immature Granulocytes % 0.4 0.0 - 5.0 %    Lymphocytes % 23.7 20.0 - 42.0 %    Monocytes % 9.4 2.0 - 12.0 %    Eosinophils % 3.7 0.0 - 6.0 %    Basophils % 0.6 0.0 - 2.0 %    Neutrophils Absolute 3.18 1.80 - 7.30 E9/L    Immature Granulocytes # 0.02 E9/L    Lymphocytes Absolute 1.21 (L) 1.50 - 4.00 E9/L    Monocytes Absolute 0.48 0.10 - 0.95 E9/L    Eosinophils Absolute 0.19 0.05 - 0.50 E9/L    Basophils Absolute 0.03 0.00 - 0.20 E9/L   Comprehensive Metabolic Panel w/ Reflex to MG   Result Value Ref Range    Sodium 140 132 - 146 mmol/L    Potassium reflex Magnesium 4.5 3.5 - 5.0 mmol/L    Chloride 103 98 - 107 mmol/L    CO2 27 22 - 29 mmol/L    Anion Gap 10 7 - 16 mmol/L    Glucose 138 (H) 74 - 99 mg/dL    BUN 26 (H) 6 - 23 mg/dL    CREATININE 1.3 (H) 0.7 - 1.2 mg/dL    GFR Non-African American 55 >=60 mL/min/1.73    GFR African American >60     Calcium 9.7 8.6 - 10.2 mg/dL    Total Protein 7.5 6.4 - 8.3 g/dL    Albumin 3.9 3.5 - 5.2 g/dL    Total Bilirubin 0.6 0.0 - 1.2 mg/dL    Alkaline Phosphatase 126 40 - 129 U/L    ALT 49 (H) 0 - 40 U/L    AST 47 (H) 0 - 39 U/L   Troponin   Result Value Ref Range    Troponin, High Sensitivity 16 (H) 0 - 11 ng/L   Brain Natriuretic Peptide   Result Value Ref Range    Pro- (H) 0 - 125 pg/mL   Troponin   Result Value Ref Range    Troponin, High Sensitivity 15 (H) 0 - 11 ng/L   SPECIMEN REJECTION   Result Value Ref Range    Rejected Test DIMER     Reason for Rejection see below    SPECIMEN REJECTION   Result Value Ref Range    Rejected Test DIMER     Reason for Rejection see below    D-Dimer, Quantitative   Result Value Ref Range    D-Dimer, Quant 217 ng/mL DDU   Troponin   Result Value Ref Range    Troponin, High Sensitivity 14 (H) 0 - 11 ng/L   EKG 12 Lead   Result Value Ref Range    Ventricular Rate 72 BPM    Atrial Rate 72 BPM    P-R Interval 180 ms    QRS Duration 92 ms    Q-T Interval 402 ms    QTc Calculation (Bazett) 440 ms    P Axis 6 degrees    R Axis -26 degrees    T Axis 99 degrees       RADIOLOGY:  XR CHEST 1 VIEW   Final Result   No active cardiopulmonary disease.             ------------------------- NURSING NOTES AND VITALS REVIEWED ---------------------------  Date / Time Roomed:  11/2/2021  3:17 PM  ED Bed Assignment:  21/21    The nursing notes within the ED encounter and vital signs as below have been reviewed. Patient Vitals for the past 24 hrs:   BP Temp Temp src Pulse Resp SpO2 Height Weight   11/02/21 2234 (!) 113/57 -- -- 61 16 95 % -- --   11/02/21 2228 -- -- -- -- -- 95 % -- --   11/02/21 1442 116/67 98.4 °F (36.9 °C) Oral 69 16 97 % 6' (1.829 m) 250 lb (113.4 kg)       Oxygen Saturation Interpretation: Normal    ------------------------------------------ PROGRESS NOTES ------------------------------------------  Re-evaluation(s):  Time: 2030  Patients symptoms show no change  Repeat physical examination is not changed    Counseling:  I have spoken with the patient and discussed todays results, in addition to providing specific details for the plan of care and counseling regarding the diagnosis and prognosis. Their questions are answered at this time and they are agreeable with the plan of admission.    --------------------------------- ADDITIONAL PROVIDER NOTES ---------------------------------  Consultations:  Time: 1900. Spoke with Dr. Matthew Boone. Discussed case. They will admit the patient. This patient's ED course included: a personal history and physicial examination, re-evaluation prior to disposition, multiple bedside re-evaluations, cardiac monitoring, continuous pulse oximetry and complex medical decision making and emergency management    This patient has remained hemodynamically stable during their ED course. Diagnosis:  1. Chest pain, unspecified type        Disposition:  Patient's disposition: Admit to med/surg floor  Patient's condition is stable.          Madonna Gifford DO  Resident  11/02/21 2691

## 2021-11-02 NOTE — ED PROVIDER NOTES
and Sulfamethoxazole-trimethoprim     Initial Plan of Care: Initiate Treatment-Testing, Proceed toTreatment Area When Bed Available for ED Attending/MLP to Continue Care      ---END OF FIRST PROVIDER CONTACT ASSESSMENT NOTE---  Electronically signed by Liban Troy PA-C   DD: 11/2/21        Liban Troy PA-C  11/02/21 1456

## 2021-11-03 VITALS
WEIGHT: 260.58 LBS | TEMPERATURE: 97.6 F | DIASTOLIC BLOOD PRESSURE: 65 MMHG | SYSTOLIC BLOOD PRESSURE: 137 MMHG | HEIGHT: 72 IN | RESPIRATION RATE: 16 BRPM | HEART RATE: 60 BPM | BODY MASS INDEX: 35.3 KG/M2 | OXYGEN SATURATION: 96 %

## 2021-11-03 LAB
EKG ATRIAL RATE: 72 BPM
EKG P AXIS: 6 DEGREES
EKG P-R INTERVAL: 180 MS
EKG Q-T INTERVAL: 402 MS
EKG QRS DURATION: 92 MS
EKG QTC CALCULATION (BAZETT): 440 MS
EKG R AXIS: -26 DEGREES
EKG T AXIS: 99 DEGREES
EKG VENTRICULAR RATE: 72 BPM

## 2021-11-03 PROCEDURE — 6370000000 HC RX 637 (ALT 250 FOR IP): Performed by: INTERNAL MEDICINE

## 2021-11-03 PROCEDURE — 96372 THER/PROPH/DIAG INJ SC/IM: CPT

## 2021-11-03 PROCEDURE — 6360000002 HC RX W HCPCS: Performed by: INTERNAL MEDICINE

## 2021-11-03 PROCEDURE — 93010 ELECTROCARDIOGRAM REPORT: CPT | Performed by: INTERNAL MEDICINE

## 2021-11-03 PROCEDURE — G0378 HOSPITAL OBSERVATION PER HR: HCPCS

## 2021-11-03 RX ORDER — ISOSORBIDE MONONITRATE 60 MG/1
60 TABLET, EXTENDED RELEASE ORAL DAILY
Status: DISCONTINUED | OUTPATIENT
Start: 2021-11-03 | End: 2021-11-03 | Stop reason: HOSPADM

## 2021-11-03 RX ORDER — ISOSORBIDE MONONITRATE 60 MG/1
60 TABLET, EXTENDED RELEASE ORAL DAILY
Qty: 30 TABLET | Refills: 0 | Status: SHIPPED | OUTPATIENT
Start: 2021-11-04

## 2021-11-03 RX ADMIN — ENOXAPARIN SODIUM 40 MG: 40 INJECTION SUBCUTANEOUS at 01:06

## 2021-11-03 RX ADMIN — MULTIPLE VITAMINS W/ MINERALS TAB 1 TABLET: TAB at 08:06

## 2021-11-03 RX ADMIN — CITALOPRAM HYDROBROMIDE 20 MG: 20 TABLET ORAL at 08:07

## 2021-11-03 RX ADMIN — GABAPENTIN 100 MG: 100 CAPSULE ORAL at 01:05

## 2021-11-03 RX ADMIN — TAMSULOSIN HYDROCHLORIDE 0.4 MG: 0.4 CAPSULE ORAL at 01:05

## 2021-11-03 RX ADMIN — ISOSORBIDE MONONITRATE 60 MG: 60 TABLET, EXTENDED RELEASE ORAL at 11:39

## 2021-11-03 RX ADMIN — LEVOTHYROXINE SODIUM 150 MCG: 75 TABLET ORAL at 06:46

## 2021-11-03 RX ADMIN — AMLODIPINE BESYLATE 5 MG: 5 TABLET ORAL at 08:06

## 2021-11-03 RX ADMIN — Medication 400 MG: at 08:06

## 2021-11-03 RX ADMIN — ATORVASTATIN CALCIUM 40 MG: 40 TABLET, FILM COATED ORAL at 01:05

## 2021-11-03 RX ADMIN — CALCIUM CARBONATE-VITAMIN D TAB 500 MG-200 UNIT 1 TABLET: 500-200 TAB at 08:06

## 2021-11-03 RX ADMIN — ALLOPURINOL 200 MG: 100 TABLET ORAL at 08:06

## 2021-11-03 RX ADMIN — POTASSIUM CITRATE 10 MEQ: 5 TABLET ORAL at 08:06

## 2021-11-03 RX ADMIN — ASPIRIN 81 MG CHEWABLE TABLET 81 MG: 81 TABLET CHEWABLE at 08:07

## 2021-11-03 RX ADMIN — GABAPENTIN 100 MG: 100 CAPSULE ORAL at 08:07

## 2021-11-03 ASSESSMENT — PAIN SCALES - GENERAL
PAINLEVEL_OUTOF10: 0
PAINLEVEL_OUTOF10: 0

## 2021-11-03 NOTE — PROGRESS NOTES
Dr Emily David made aware there isn't any cardiac testing being planned at the moment.  He will be up to see the patient soon

## 2021-11-03 NOTE — CARE COORDINATION
Social Work / Discharge Planning : Patient admitted with chest pain. Patient from HOME with spouse. Patient has a quad cane and wheelchair. He does have a hx with [facility] and [facility]. Patient PCP is DR Bassem Luna. [facility] plan. SW to follow.  Electronically signed by KALA Crawford on 11/3/21 at 8:11 AM EDT

## 2021-11-03 NOTE — H&P
Internal Medicine History & Physical     Name: Aleksey Stewart  : 1955  Chief Complaint: Chest Pain (since last night, tightness mid chest, lessened since last night) and Shortness of Breath (with exertion x 1 week)  Primary Care Physician: Nayana Meier DO  Admission date: 2021  Date of service: 11/3/2021     History of Present Illness  Faith Pacheco is a 61y.o. year old male with a PMH of hemiplegia, wheel chair use, hypothyroidism, depression, HTN who presented with a chief complaint of chest pain    Pt presented to the ED with complaints of substernal chest pain onset last night. He describes the pain as a pressure sensation that radiates to his left shoulder and is moderate in severity. Pain is aggravated with exertion and better with rest. He reports associated shortness of breath that is also worse with exertion. He did have similar symptoms to this in 2021 and was admitted to the hospital and evaluated by cardiology. He was started on metoprolol and nitroglycerin for unstable angina and has been taking his metoprolol as prescribed. He denies any fevers, chills, nausea, vomiting, cough or abdominal pain. He does not have any sick contacts at home. He has never had any syncopal episodes or palpitations.      In the ED   Trop 16 --> 15   EKG no ST segment changes   D-Dimer 200    Cardiology was asked to evaluate the patient     The patient was admitted for Chest Pain        Past Medical History:   Diagnosis Date    Bleeding tendency ([address]     Diabetes mellitus ([address]     DVT (deep venous thrombosis) ([address]     subclavian vein    Gout     Hydrocephalus ([address]     Hypertension     Hypothyroidism     Intracranial bleed ([address]     Left knee pain 2018    Pain of right side of body     RADIATES TO RT LEG    Tingling     SOMRTIMES RT HAND & LEG    Traumatic brain injury ([address]     GAS WELL EXPLOSION WITH  TRAUMATIC BRAIN INJURY AND RIGHTHEMIPARESIS     Urination Pt calling back stating she would like to speak with Dr Webster about her symptoms below are still the same and have not got better.  They worse at night.    Please call after 11:30 [phone]   decrease     Visual disturbances     Weakness of right side of body        Past Surgical History:   Procedure Laterality Date    BACLOFEN PUMP IMPLANTATION  07/2015    BRAIN SURGERY       SHUNT JULY 2008    FRACTURE SURGERY      RIGHT ARM WITH WINTER PLACEMENT    HIP SURGERY      HETEROTOPIC OSSIFICATION RIGHT HIP    JOINT REPLACEMENT      RIGHT KNEE ARTHROSCOPIC    KIDNEY STONE SURGERY      PARTIAL NEPHRECTOMY      VENTRICULOPERITONEAL SHUNT  2008       Family History   Problem Relation Age of Onset    COPD Mother     Diabetes Mother     Other Father         Black lung    Cancer Brother         Lung, Stomach         Social History  Patient lives at home with wife   At baseline patient ambulates with assistance and wheelchair   Illicit drugs: Denies   TOBACCO:   reports that he has never smoked. He has never used smokeless tobacco.  ETOH:   reports no history of alcohol use. Home Medications  Prior to Admission medications    Medication Sig Start Date End Date Taking? Authorizing Provider   nitroGLYCERIN (NITROSTAT) 0.4 MG SL tablet up to max of 3 total doses.  If no relief after 1 dose, call 911. 4/11/21  Yes Shania Good MD   atorvastatin (LIPITOR) 40 MG tablet Take 1 tablet by mouth nightly 4/11/21  Yes Shania Good MD   metoprolol succinate (TOPROL XL) 25 MG extended release tablet Take 1 tablet by mouth daily 4/12/21  Yes Shania Good MD   allopurinol (ZYLOPRIM) 100 MG tablet Take 200 mg by mouth daily   Yes Historical Provider, MD   amLODIPine (NORVASC) 5 MG tablet Take 1 tablet by mouth daily 6/25/20  Yes Kiersten Mar PA-C   citalopram (CELEXA) 20 MG tablet Take 1 tablet by mouth daily 6/25/20  Yes Kiersten Mar PA-C   levothyroxine (SYNTHROID) 150 MCG tablet Take 1 tablet by mouth Daily 6/25/20  Yes Kiersten Mar PA-C   MAGNESIUM OXIDE PO Take 40 mg by mouth daily    Yes Historical Provider, MD   tamsulosin (FLOMAX) 0.4 MG capsule Take 0.4 mg by mouth nightly  4/12/18  Yes Historical Provider, MD   Calcium Carbonate-Vitamin D (OYSTER SHELL CALCIUM/D) 500-200 MG-UNIT TABS Take 1 tablet by mouth 2 times daily    Yes Historical Provider, MD   Misc Natural Products (OSTEO BI-FLEX JOINT SHIELD PO) Take 1 tablet by mouth daily    Yes Historical Provider, MD   potassium citrate (UROCIT-K) 10 MEQ (1080 MG) extended release tablet Take 10 mEq by mouth 2 times daily  1/15/17  Yes Historical Provider, MD   BACLOFEN, PAIN PUMP REFILL CHARGE, 2.83 mcg by Implant route continuous    Yes Historical Provider, MD   gabapentin (NEURONTIN) 100 MG capsule Take 100 mg by mouth 2 times daily   Yes Historical Provider, MD   aspirin 81 MG tablet Take 81 mg by mouth nightly    Yes Historical Provider, MD   therapeutic multivitamin-minerals (THERAGRAN-M) tablet Take 1 tablet by mouth daily    Yes Historical Provider, MD       Allergies  Allergies   Allergen Reactions    Septra [Bactrim]      SEPTRA DS REDNESS HIVES AND HARD TIME BREATHING    Sulfamethoxazole-Trimethoprim Rash       Review of Systems  Please see HPI above. All bolded are positive. All un-bolded are negative.   Constitutional Symptoms: fever, chills, fatigue, generalized weakness, diaphoresis, increase in thirst, loss of appetite  Eyes: vision change   Ears, Nose, Mouth, Throat: hearing loss, nasal congestion, sores in the mouth  Cardiovascular: chest pain, chest heaviness, palpitations  Respiratory: shortness of breath, wheezing, coughing  Gastrointestinal: abdominal pain, nausea, vomiting, diarrhea, constipation, melena, hematochezia, hematemesis  Genitourinary: dysuria, hematuria, increased frequency  Musculoskeletal: lower extremity edema, myalgias, arthralgias, back pain  Integumentary: rashes, itching   Neurological: headache, lightheadedness, dizziness, confusion, syncope, numbness, tingling, focal weakness  Psychiatric: depression, suicidal ideation, anxiety  Endocrine: unintentional weight change  Hematologic/Lymphatic: lymphadenopathy, easy bruising, easy bleeding   Allergic/Immunologic: recurrent infections      Objective  VITALS:  /65   Pulse 60   Temp 97.6 °F (36.4 °C) (Oral)   Resp 16   Ht 6' (1.829 m)   Wt 260 lb 9.3 oz (118.2 kg)   SpO2 96%   BMI 35.34 kg/m²     Physical Exam:  General: awake, alert, oriented to person, place, time, and purpose, appears stated age, cooperative, no acute distress, pleasant, appropriate mood  Eyes: conjunctivae/corneas clear, sclera non icteric, EOMI  Ears: no obvious scars, no lesions, no masses, hearing intact  Mouth: mucous membranes moist, no obvious oral sores  Head: normocephalic, atraumatic  Neck: no JVD, no adenopathy, no thyromegaly, neck is supple, trachea is midline  Back: ROM normal, no CVA tenderness. Chest: no pain on palpation  Lungs: clear to auscultation bilaterally, without rhonchi, crackle, wheezing, or rale, no retractions or use of accessory muscles  Heart: regular rate and regular rhythm, no murmur, normal S1, S2  Abdomen: soft, non-tender; bowel sounds normal; no masses, no organomegaly  : Deferred   Extremities: no lower extremity edema, extremities atraumatic, no cyanosis, no clubbing, 2+ pedal pulses palpated  Skin: normal color, normal texture, normal turgor, no rashes, no lesions  Neurologic:Hemiplegia     Labs-   Lab Results   Component Value Date    WBC 5.1 11/02/2021    HGB 13.8 11/02/2021    HCT 41.8 11/02/2021     11/02/2021     11/02/2021    K 4.5 11/02/2021     11/02/2021    CREATININE 1.3 (H) 11/02/2021    BUN 26 (H) 11/02/2021    CO2 27 11/02/2021    GLUCOSE 138 (H) 11/02/2021    ALT 49 (H) 11/02/2021    AST 47 (H) 11/02/2021    INR 1.2 04/09/2021     Lab Results   Component Value Date    CKTOTAL 82 11/27/2017    CKMB 2.1 11/27/2017    TROPONINI <0.01 04/10/2021       Recent Radiological Studies:  XR CHEST 1 VIEW   Final Result   No active cardiopulmonary disease.              Assessment  Active Hospital Problems    Diagnosis     Atypical chest pain [R07.89]     Unstable angina (HCC) [I20.0]     Old myocardial infarct [I25.2]     Chest pain [R07.9]     Right hemiparesis (HCC) [G81.91]     Post-traumatic hydrocephalus (HCC) [G91.3]     Hypothyroidism [E03.9]     Depression [F32. A]     Benign hypertension [I10]        Patient Active Problem List    Diagnosis Date Noted    Atypical chest pain 11/02/2021    Unstable [address] 04/10/2021    Chest pain 04/09/2021    Old myocardial infarct 04/09/2021    Hypothyroidism 01/08/2020     -due for labs today      Depression 01/08/2020     -mood doing well on Celexa      Right hemiparesis ([facility] 01/08/2020     -no change, he continues to use wheelchair      Benign hypertension 01/08/2020     stable       Gout 01/08/2020     recheck labs today - no flare ups      Post-traumatic hydrocephalus ([facility] [address] 01/08/2020       Plan  · Chest pain   · Cardiology has evaluated and made medication recommendations  · Will DC patient today on isosorbide mononitrate with op cardiac fu and close PCP fu within 1 week   · PT/OT  · Consults Cardio  · Home medications to be reconciled and confirmed prior to being ordered  · DVT prophylaxis   · Code Status Full  · Discharge plan: Today, home, close op fu     Uriel Islas MD  Internal medicine   11/3/2021  8:13 AM    I can be reached through [employer]. NOTE:  This report was transcribed using voice recognition software. Every effort was made to ensure accuracy; however, inadvertent computerized transcription errors may be present.

## 2021-11-03 NOTE — DISCHARGE SUMMARY
The patient was discharged on the same day as history and physical.  Please see history and physical as well as imaging studies, consult and evaluations, and nurse's notes.     Electronically signed by Turner Barrett MD on 11/3/2021 at 3:19 PM

## 2021-11-03 NOTE — CARE COORDINATION
He presented to the hospital with chest pain. He refused a cardiac cath in the past.  His cardiologist is Dr. Jorge Chang. ED recommended admission. I placed admission orders. ddimer is pending and CAT chest may be needed. ED physician to address if needed.      Electronically signed by Sander Ospina DO on 11/2/2021 at 8:15 PM

## 2021-11-03 NOTE — CONSULTS
[facility] cardiology/the [facility] of [address] [place] [address]    Name: Eddie Avila    Age: 61 y.o. Date of Admission: 11/2/2021  3:17 PM    Date of Service: 11/3/2021    Reason for Consultation: Admitted feeling tired and debilitated, where chest symptoms, old right hemiplegia after CNS bleed about 13 years ago,    Referring Physician:     History of Present Illness: The patient is a 61y.o. year old male with a known history of hypertension, hyperlipidemia and prior CNS bleed with residual right-sided hemiplegia. When I asked him why he came to the hospital first he conveyed was that he just was not feeling well, tired fatigue and could not do much at home even just getting in his wheelchair feel wiped out. No indication of any distinct associated diaphoresis or shortness of breath with this. He does get occasional chest symptoms over the last couple of months. Not any progressive symptoms. Sometimes occurs at rest.    Past Medical History: Lexiscan stress test done during last hospitalization April 2021 showed LVEF 64% with large size, severe intensity fixed perfusion defect to the anterior septum extending to the apex, mild gerald-infarct ischemia. Past surgical history: Brain surgery  shunt July 2008, right hip surgery, right knee arthroscopic surgery, partial nephrectomy    Review of Systems:     Constitutional: No fever, chills, sweats  Cardiac: As per HPI  Pulmonary: No cough, wheeze, hemoptysis  HEENT: No visual disturbances or difficult swallowing  GI: No nausea, vomiting, diarrhea, abdominal pain, rectal bleeding  : No dysuria or hematuria  Endocrine: No excessive thirst, heat or cold intolerance. Musculoskeletal: No joint pain or muscle aches.  No claudication  Skin: No skin breakdown or rashes  Neuro: No headache, confusion, or seizures  Psych: No depression, anxiety      Family History:  Family History   Problem Relation Age of Onset    COPD Mother    Margarita Diabetes Mother     Other Father         Black lung    Cancer Brother         Lung, Stomach       Social History:  Social History     Socioeconomic History    Marital status:      Spouse name: Not on file    Number of children: Not on file    Years of education: Not on file    Highest education level: Not on file   Occupational History    Not on file   Tobacco Use    Smoking status: Never Smoker    Smokeless tobacco: Never Used   Vaping Use    Vaping Use: Never used   Substance and Sexual Activity    Alcohol use: No    Drug use: Never    Sexual activity: Never   Other Topics Concern    Not on file   Social History Narrative    Not on file     Social Determinants of Health     Financial Resource Strain:     Difficulty of Paying Living Expenses:    Food Insecurity:     Worried About Running Out of Food in the Last Year:     [address] in the Last Year:    Transportation Needs:     Lack of Transportation (Medical):  Lack of Transportation (Non-Medical):    Physical Activity:     Days of Exercise per Week:     Minutes of Exercise per Session:    Stress:     Feeling of Stress :    Social Connections:     Frequency of Communication with Friends and Family:     Frequency of Social Gatherings with Friends and Family:     Attends Jainism Services:     Active Member of Clubs or Organizations:     Attends Club or Organization Meetings:     Marital Status:    Intimate Partner Violence:     Fear of Current or Ex-Partner:     Emotionally Abused:     Physically Abused:     Sexually Abused: Allergies:   Allergies   Allergen Reactions    Septra [Bactrim]      SEPTRA DS REDNESS HIVES AND HARD TIME BREATHING    Sulfamethoxazole-Trimethoprim Rash       Current Medications:  Current Facility-Administered Medications   Medication Dose Route Frequency Provider Last Rate Last Admin    ondansetron (ZOFRAN-ODT) disintegrating tablet 4 mg  4 mg Oral Q8H PRN Felipe Smith DO Or    ondansetron (ZOFRAN) injection 4 mg  4 mg IntraVENous Q6H PRN Felipe Menjivarino, DO        acetaminophen (TYLENOL) tablet 650 mg  650 mg Oral Q6H PRN Felipe Smith, DO        Or    acetaminophen (TYLENOL) suppository 650 mg  650 mg Rectal Q6H PRN Felipe Menjivarino, DO        magnesium hydroxide (MILK OF MAGNESIA) 400 MG/5ML suspension 30 mL  30 mL Oral Daily PRN Felipe Menjivarino, DO        aspirin chewable tablet 81 mg  81 mg Oral Daily Felipe E Volino, DO   81 mg at 11/03/21 0807    enoxaparin (LOVENOX) injection 40 mg  40 mg SubCUTAneous Daily Felipe YUEN Volino, DO   40 mg at 11/03/21 0106    atorvastatin (LIPITOR) tablet 40 mg  40 mg Oral Nightly Felipe Menjivarino, DO   40 mg at 11/03/21 0105    allopurinol (ZYLOPRIM) tablet 200 mg  200 mg Oral Daily Felipe Smith, DO   200 mg at 11/03/21 0806    amLODIPine (NORVASC) tablet 5 mg  5 mg Oral Daily Felipe Smith, DO   5 mg at 11/03/21 0806    oyster shell calcium w/D 500-200 MG-UNIT tablet 1 tablet  1 tablet Oral BID Felipe YUEN Volino, DO   1 tablet at 11/03/21 0806    citalopram (CELEXA) tablet 20 mg  20 mg Oral Daily Felipe Smith, DO   20 mg at 11/03/21 1393    gabapentin (NEURONTIN) capsule 100 mg  100 mg Oral BID Felipe Smith, DO   100 mg at 11/03/21 0807    levothyroxine (SYNTHROID) tablet 150 mcg  150 mcg Oral Daily Felipe Smith, DO   150 mcg at 11/03/21 0646    magnesium oxide (MAG-OX) tablet 400 mg  400 mg Oral Daily Felipe Menjivarino, DO   400 mg at 11/03/21 0806    metoprolol succinate (TOPROL XL) extended release tablet 25 mg  25 mg Oral Daily Felipe Smith, DO        potassium citrate (UROCIT-K) extended release tablet 10 mEq  10 mEq Oral BID Felipe Menjivarino, DO   10 mEq at 11/03/21 0806    tamsulosin (FLOMAX) capsule 0.4 mg  0.4 mg Oral Nightly Felipe Smith, DO   0.4 mg at 11/03/21 0105    therapeutic multivitamin-minerals 1 tablet  1 tablet Oral Daily Felipe Smith DO   1 tablet at 11/03/21 0806         Physical Exam:  /65 Pulse 60   Temp 97.6 °F (36.4 °C) (Oral)   Resp 16   Ht 6' (1.829 m)   Wt 260 lb 9.3 oz (118.2 kg)   SpO2 96%   BMI 35.34 kg/m²   Weight change: Wt Readings from Last 3 Encounters:   11/03/21 260 lb 9.3 oz (118.2 kg)   04/11/21 244 lb 1.6 oz (110.7 kg)   03/13/21 250 lb (113.4 kg)         General: Awake, alert, oriented x3, no acute distress  HEENT: Unremarkable  Neck: No JVD or bruits. Cardiac: Regular rate and rhythm, normal S1 and S2, no extra heart sounds, murmurs, heaves, thrills  Resp: Lungs clear without wheezing or crackles. No accessory muscle use or retraction  Abdomen: soft, nontender, nondistended, no gross organomegaly or mass  Skin: Warm and dry, no cyanosis. Musculoskeletal: normal tone and strength in the upper and lower extremities bilaterally  Neuro: Right-sided hemiplegia  Psych: Cooperative, and normal affect    Intake/Output:    Intake/Output Summary (Last 24 hours) at 11/3/2021 1002  Last data filed at 11/3/2021 0643  Gross per 24 hour   Intake --   Output 500 ml   Net -500 ml     No intake/output data recorded. Laboratory Tests:  Lab Results   Component Value Date    CREATININE 1.3 (H) 11/02/2021    BUN 26 (H) 11/02/2021     11/02/2021    K 4.5 11/02/2021     11/02/2021    CO2 27 11/02/2021     No results for input(s): CKTOTAL, CKMB in the last 72 hours.     Invalid input(s): TROPONONI  No results found for: BNP  Lab Results   Component Value Date    WBC 5.1 11/02/2021    RBC 4.45 11/02/2021    HGB 13.8 11/02/2021    HCT 41.8 11/02/2021    MCV 93.9 11/02/2021    MCH 31.0 11/02/2021    MCHC 33.0 11/02/2021    RDW 15.9 11/02/2021     11/02/2021    MPV 9.7 11/02/2021     Recent Labs     11/02/21  1559   ALKPHOS 126   ALT 49*   AST 47*   PROT 7.5   BILITOT 0.6   LABALBU 3.9     Lab Results   Component Value Date    MG 1.9 06/03/2021     Lab Results   Component Value Date    PROTIME 12.4 04/09/2021    INR 1.2 04/09/2021     Lab Results   Component Value Date    TSH 1.980 08/13/2020     No components found for: CHLPL  Lab Results   Component Value Date    TRIG 198 (H) 04/10/2021    TRIG 143 08/13/2020    TRIG 240 01/08/2019     Lab Results   Component Value Date    HDL 23 04/10/2021    HDL 26 08/13/2020    HDL 21 (A) 01/08/2019     Lab Results   Component Value Date    LDLCALC 73 04/10/2021    LDLCALC 74 08/13/2020    [address] 01/08/2019     Lab Results   Component Value Date    INR 1.2 04/09/2021       Admission ECG November 2, 2021 at 1451 reviewed by me revealed normal sinus rhythm heart rate is 72,      Personally reviewed his telemetry and it shows normal sinus rhythm heart rate of 59    All blood work reviewed and troponin XVI then 15 then 14, creatinine 1.3 BUN 26. ASSESSMENT / PLAN:    1. Generalized fatigue, vague chest symptoms not exertional related. No associated diaphoresis or shortness of breath when he has the symptoms. Troponins not pathologic 16 then 15 then 14. EKG without ischemic change no significant change from prior EKG April 9, 2021. Nuclear stress test April 2021 LVEF 64% and question of limited area of gerald-infarct ischemia. Would add isosorbide mononitrate 60 mg at bedtime to his medical regimen, blood pressure should tolerate. Continue to modify cardiovascular risk factors. #2 hypertension follow-up blood pressure on current medical regimen on metoprolol succinate 25 mg daily also benefit for cardiovascular status. Continues amlodipine 5 mg daily but if needed to could decrease to 2.5 mg daily if blood pressure low. #3 hyperlipidemia and continue atorvastatin 40 mg daily. Yesterday liver function tests are all normal.    #4 prior CNS bleed with residual right-sided hemiplegia. Continue to modify cardiovascular risk factors optimizing blood pressure, blood sugar and lipids long-term. Up to chair with assistance and could be discharged later today from cardiology viewpoint.   Plan outpatient follow-up [facility] in a month.     Electronically signed by Yasmine Bender MD on 11/3/2021 at 10:02 AM

## 2021-11-04 ENCOUNTER — CARE COORDINATION (OUTPATIENT)
Dept: CARE COORDINATION | Age: 66
End: 2021-11-04

## 2021-11-04 NOTE — CARE COORDINATION
Jim [facility] Transitions Initial Follow Up Call    Call within 2 business days of discharge: Yes    Patient: Jailene Fitzpatrick Patient : 1955   MRN: [id number]  Reason for Admission: SEBMANI  Discharge Date: 11/3/21 RARS: Readmission Risk Score: 12.2      Last Discharge [facility]       Complaint Diagnosis Description Type Department Provider    21 Chest Pain; Shortness of Breath Chest pain, unspecified type ED to Hosp-Admission (Discharged) (ADMITTED) [facility] Kary Ro MD; ALFRED Holliday. Spoke with: Wife, Marvin (on HIPAA). States patient was not available for phone call. [place] states patient is feeling better. He is no longer having shortness of breath or chest pain and feeling good. All medications written on discharge have been filled and patient is taking them as wirrten. Medications were reviewed. Appetite:  States patient's appetite is good. Bowels/bladder:  States no bowel or bladder elimination concerns at this time. Follow up appointments:  Wife states she will schedule follow up appointments for next week with PCP as well as cardiology. [facility] No follow up necessary. Facility: [facility]    Non-face-to-face services provided:  Obtained and reviewed discharge summary and/or continuity of care documents     Transitions of Care Initial Call    Was this an external facility discharge? No Discharge Facility: [facility]    Challenges to be reviewed by the provider   Additional needs identified to be addressed with provider: No  none             Method of communication with provider : none      Advance Care Planning:   Does patient have an Advance Directive: reviewed and current. Was this a readmission? No  Patient stated reason for admission:   Patients top risk factors for admission:      Care Transition Nurse (CTN) contacted the family by telephone to perform post hospital discharge assessment.  Verified name and  with have all of your prescriptions and are they filled?: Yes  Have you been contacted by a [address]?: No  Have you scheduled your follow up appointment?: Yes  How are you going to get to your appointment?: Car - family or friend to transport  Were you discharged with any Home Care or Post Acute Services: No  Do you feel like you have everything you need to keep you well at home?: Yes  Care Transitions Interventions  No Identified Needs         Follow Up  No future appointments.     Serg Garcia LPN

## 2021-11-09 VITALS
HEIGHT: 72 IN | HEART RATE: 65 BPM | DIASTOLIC BLOOD PRESSURE: 77 MMHG | SYSTOLIC BLOOD PRESSURE: 154 MMHG | OXYGEN SATURATION: 98 % | RESPIRATION RATE: 18 BRPM | BODY MASS INDEX: 35.21 KG/M2 | WEIGHT: 260 LBS

## 2021-11-09 PROCEDURE — 4500000002 HC ER NO CHARGE

## 2021-11-09 ASSESSMENT — PAIN DESCRIPTION - ONSET: ONSET: SUDDEN

## 2021-11-09 ASSESSMENT — PAIN DESCRIPTION - LOCATION: LOCATION: BACK

## 2021-11-09 ASSESSMENT — PAIN DESCRIPTION - FREQUENCY: FREQUENCY: INTERMITTENT

## 2021-11-09 ASSESSMENT — PAIN - FUNCTIONAL ASSESSMENT: PAIN_FUNCTIONAL_ASSESSMENT: PREVENTS OR INTERFERES SOME ACTIVE ACTIVITIES AND ADLS

## 2021-11-09 ASSESSMENT — PAIN SCALES - GENERAL: PAINLEVEL_OUTOF10: 10

## 2021-11-09 ASSESSMENT — PAIN DESCRIPTION - PAIN TYPE: TYPE: ACUTE PAIN

## 2021-11-09 ASSESSMENT — PAIN DESCRIPTION - ORIENTATION: ORIENTATION: RIGHT;LOWER

## 2021-11-09 ASSESSMENT — PAIN DESCRIPTION - DESCRIPTORS: DESCRIPTORS: STABBING

## 2021-11-10 ENCOUNTER — HOSPITAL ENCOUNTER (EMERGENCY)
Age: 66
Discharge: LEFT BEFORE DISCHARGE | End: 2021-11-10
Payer: MEDICARE

## 2021-11-10 NOTE — ED NOTES
Patient informed this RN that they do not want to wait any longer. This RN attempted to convince patient to stay and be seen in the ER but patient still not wanting to stay. Patient signed paper and informed to return at their earliest convenience and follow up with their PCP.      Richard Pantoja, SHY  11/10/21 3036

## 2021-11-10 NOTE — ED NOTES
FIRST PROVIDER CONTACT ASSESSMENT NOTE      Department of Emergency Medicine   Admit Date: No admission date for patient encounter. Chief Complaint: Back Pain (right lower back pain x1-2 hours, belching, and nausea, states has similar Sunday night, family reports pt does not have right kidney) and Nausea      History of Present Illness:    Jacinto Parra is a 61 y.o. male who presents to the ED for nausea, belching, right back pain but pt does not have right kidney. He was just in here a few days ago with similar symptoms but it was more in his chest and he was worked up as chest pain and had a negative cardiac work-up. Patient denies urinary or bowel symptoms. Patient denies fevers. Physical exam.  Patient is obese with nontender abdomen. Positive bowel sounds, no reproducible pain in the right thoracic back where patient indicates his pain is. No midline back tenderness. Heart regular rate and rhythm. Lungs clear to auscultation bilaterally.         -----------------END OF FIRST PROVIDER CONTACT ASSESSMENT NOTE--------------  Electronically signed by Kg Fitzgerald PA-C   DD: 11/9/21               Kg Fitzgerald PA-C  11/09/21 0347

## 2022-03-10 ENCOUNTER — OFFICE VISIT (OUTPATIENT)
Dept: FAMILY MEDICINE CLINIC | Age: 67
End: 2022-03-10
Payer: MEDICARE

## 2022-03-10 VITALS
WEIGHT: 260 LBS | DIASTOLIC BLOOD PRESSURE: 66 MMHG | SYSTOLIC BLOOD PRESSURE: 122 MMHG | TEMPERATURE: 97.5 F | HEART RATE: 72 BPM | OXYGEN SATURATION: 98 % | BODY MASS INDEX: 35.21 KG/M2 | HEIGHT: 72 IN

## 2022-03-10 DIAGNOSIS — M54.2 NECK PAIN: Primary | ICD-10-CM

## 2022-03-10 DIAGNOSIS — M54.12 CERVICAL RADICULOPATHY: ICD-10-CM

## 2022-03-10 PROCEDURE — 99213 OFFICE O/P EST LOW 20 MIN: CPT | Performed by: INTERNAL MEDICINE

## 2022-03-10 RX ORDER — PREDNISONE 10 MG/1
TABLET ORAL
Qty: 18 TABLET | Refills: 0 | Status: SHIPPED | OUTPATIENT
Start: 2022-03-10 | End: 2022-03-19

## 2022-03-10 SDOH — ECONOMIC STABILITY: FOOD INSECURITY: WITHIN THE PAST 12 MONTHS, YOU WORRIED THAT YOUR FOOD WOULD RUN OUT BEFORE YOU GOT MONEY TO BUY MORE.: NEVER TRUE

## 2022-03-10 SDOH — ECONOMIC STABILITY: FOOD INSECURITY: WITHIN THE PAST 12 MONTHS, THE FOOD YOU BOUGHT JUST DIDN'T LAST AND YOU DIDN'T HAVE MONEY TO GET MORE.: NEVER TRUE

## 2022-03-10 ASSESSMENT — PATIENT HEALTH QUESTIONNAIRE - PHQ9
4. FEELING TIRED OR HAVING LITTLE ENERGY: 0
7. TROUBLE CONCENTRATING ON THINGS, SUCH AS READING THE NEWSPAPER OR WATCHING TELEVISION: 0
6. FEELING BAD ABOUT YOURSELF - OR THAT YOU ARE A FAILURE OR HAVE LET YOURSELF OR YOUR FAMILY DOWN: 0
10. IF YOU CHECKED OFF ANY PROBLEMS, HOW DIFFICULT HAVE THESE PROBLEMS MADE IT FOR YOU TO DO YOUR WORK, TAKE CARE OF THINGS AT HOME, OR GET ALONG WITH OTHER PEOPLE: 0
SUM OF ALL RESPONSES TO PHQ9 QUESTIONS 1 & 2: 0
5. POOR APPETITE OR OVEREATING: 0
SUM OF ALL RESPONSES TO PHQ QUESTIONS 1-9: 0
8. MOVING OR SPEAKING SO SLOWLY THAT OTHER PEOPLE COULD HAVE NOTICED. OR THE OPPOSITE, BEING SO FIGETY OR RESTLESS THAT YOU HAVE BEEN MOVING AROUND A LOT MORE THAN USUAL: 0
2. FEELING DOWN, DEPRESSED OR HOPELESS: 0
SUM OF ALL RESPONSES TO PHQ QUESTIONS 1-9: 0
9. THOUGHTS THAT YOU WOULD BE BETTER OFF DEAD, OR OF HURTING YOURSELF: 0
3. TROUBLE FALLING OR STAYING ASLEEP: 0
1. LITTLE INTEREST OR PLEASURE IN DOING THINGS: 0

## 2022-03-10 ASSESSMENT — SOCIAL DETERMINANTS OF HEALTH (SDOH): HOW HARD IS IT FOR YOU TO PAY FOR THE VERY BASICS LIKE FOOD, HOUSING, MEDICAL CARE, AND HEATING?: NOT HARD AT ALL

## 2022-03-11 NOTE — PROGRESS NOTES
[address] [facility]      3/11/22  Florecita Guadarrama : 1955 Sex: male  Age: 74 y.o. Chief Complaint   Patient presents with    Neck Pain     onset about tuesday; noticed pain in between shoulders and it goes up into his neck; tried OTC tylenol and tylenol arthritis as well as a heating pad; heating pad did help       HPI    Patient presents to express care today accompanied by his wife complaining of neck pain with radiation into his left arm. No history of injury. No falls. States he woke up with this 2 days ago. States he had some pain between his shoulders and radiated up into the neck. He does have history of traumatic brain injury with right-sided weakness. Denies any weakness or numbness to that left arm. Review of Systems   Musculoskeletal: Positive for neck pain and neck stiffness. See above   Skin: Negative for rash. Neurological: Negative for weakness, numbness and headaches. His chronic weakness to right side secondary to traumatic brain injury. REST OF PERTINENT ROS GONE OVER AND WAS NEGATIVE. Current Outpatient Medications:     predniSONE (DELTASONE) 10 MG tablet, Take 3 tablets by mouth daily for 3 days, THEN 2 tablets daily for 3 days, THEN 1 tablet daily for 3 days. , Disp: 18 tablet, Rfl: 0    isosorbide mononitrate (IMDUR) 60 MG extended release tablet, Take 1 tablet by mouth daily, Disp: 30 tablet, Rfl: 0    nitroGLYCERIN (NITROSTAT) 0.4 MG SL tablet, up to max of 3 total doses.  If no relief after 1 dose, call 911., Disp: 25 tablet, Rfl: 3    atorvastatin (LIPITOR) 40 MG tablet, Take 1 tablet by mouth nightly, Disp: 30 tablet, Rfl: 3    metoprolol succinate (TOPROL XL) 25 MG extended release tablet, Take 1 tablet by mouth daily, Disp: 30 tablet, Rfl: 3    allopurinol (ZYLOPRIM) 100 MG tablet, Take 200 mg by mouth daily, Disp: , Rfl:     amLODIPine (NORVASC) 5 MG tablet, Take 1 tablet by mouth daily, Disp: 90 tablet, Rfl: 1    citalopram (CELEXA) 20 MG tablet, Take 1 tablet by mouth daily, Disp: 90 tablet, Rfl: 1    levothyroxine (SYNTHROID) 150 MCG tablet, Take 1 tablet by mouth Daily, Disp: 90 tablet, Rfl: 1    MAGNESIUM OXIDE PO, Take 40 mg by mouth daily , Disp: , Rfl:     tamsulosin (FLOMAX) 0.4 MG capsule, Take 0.4 mg by mouth nightly , Disp: , Rfl: 1    Calcium Carbonate-Vitamin D (OYSTER SHELL CALCIUM/D) 500-200 MG-UNIT TABS, Take 1 tablet by mouth 2 times daily , Disp: , Rfl:     Misc Natural Products (OSTEO BI-FLEX JOINT SHIELD PO), Take 1 tablet by mouth daily , Disp: , Rfl:     potassium citrate (UROCIT-K) 10 MEQ (1080 MG) extended release tablet, Take 10 mEq by mouth 2 times daily , Disp: , Rfl: 1    BACLOFEN, PAIN PUMP REFILL CHARGE,, 2.83 mcg by Implant route continuous , Disp: , Rfl:     gabapentin (NEURONTIN) 100 MG capsule, Take 100 mg by mouth 2 times daily, Disp: , Rfl:     aspirin 81 MG tablet, Take 81 mg by mouth nightly , Disp: , Rfl:     therapeutic multivitamin-minerals (THERAGRAN-M) tablet, Take 1 tablet by mouth daily , Disp: , Rfl:   Allergies   Allergen Reactions    Septra [Bactrim]      SEPTRA DS REDNESS HIVES AND HARD TIME BREATHING    Sulfamethoxazole-Trimethoprim Rash       Past Medical History:   Diagnosis Date    Bleeding tendency (HCC)     Diabetes mellitus ([facility]     DVT (deep venous thrombosis) (HCC)     subclavian vein    Gout     Hydrocephalus (HCC)     Hypertension     Hypothyroidism     Intracranial bleed (HCC)     Left knee pain 7/31/2018    Pain of right side of body     RADIATES TO RT LEG    Tingling     SOMRTIMES RT HAND & LEG    Traumatic brain injury ([facility] 2008    GAS WELL EXPLOSION WITH  TRAUMATIC BRAIN INJURY AND RIGHTHEMIPARESIS     Urination decrease     Visual disturbances     Weakness of right side of body      Past Surgical History:   Procedure Laterality Date    BACLOFEN PUMP IMPLANTATION  07/2015   [place] BRAIN SURGERY       SHUNT JULY 2008    FRACTURE SURGERY      RIGHT ARM WITH WINTER PLACEMENT    HIP SURGERY      HETEROTOPIC OSSIFICATION RIGHT HIP    JOINT REPLACEMENT      RIGHT KNEE ARTHROSCOPIC    KIDNEY STONE SURGERY      PARTIAL NEPHRECTOMY      VENTRICULOPERITONEAL SHUNT  2008     Family History   Problem Relation Age of Onset    COPD Mother     Diabetes Mother     Other Father         Black lung    Cancer Brother         Lung, Stomach     Social History     Socioeconomic History    Marital status:      Spouse name: Not on file    Number of children: Not on file    Years of education: Not on file    Highest education level: Not on file   Occupational History    Not on file   Tobacco Use    Smoking status: Never Smoker    Smokeless tobacco: Never Used   Vaping Use    Vaping Use: Never used   Substance and Sexual Activity    Alcohol use: No    Drug use: Never    Sexual activity: Never   Other Topics Concern    Not on file   Social History Narrative    Not on file     Social Determinants of Health     Financial Resource Strain: Low Risk     Difficulty of Paying Living Expenses: Not hard at all   Food Insecurity: No Food Insecurity    Worried About [address] in the Last Year: Never true    [facility] in the Last Year: Never true   Transportation Needs:     Lack of Transportation (Medical): Not on file    Lack of Transportation (Non-Medical):  Not on file   Physical Activity:     Days of Exercise per Week: Not on file    Minutes of Exercise per Session: Not on file   Stress:     Feeling of Stress : Not on file   Social Connections:     Frequency of Communication with Friends and Family: Not on file    Frequency of Social Gatherings with Friends and Family: Not on file    Attends Advent Services: Not on file    Active Member of Clubs or Organizations: Not on file    Attends Club or Organization Meetings: Not on file    Marital Status: Not on file   Intimate Partner Violence:     Fear of Current or Ex-Partner: Not on file    Emotionally Abused: Not on file    Physically Abused: Not on file    Sexually Abused: Not on file   Housing Stability:     Unable to Pay for Housing in the Last Year: Not on file    Number of Places Lived in the Last Year: Not on file    Unstable Housing in the Last Year: Not on file       Vitals:    03/10/22 1035   BP: 122/66   Pulse: 72   Temp: 97.5 °F (36.4 °C)   TempSrc: Temporal   SpO2: 98%   Weight: 260 lb (117.9 kg)   Height: 6' (1.829 m)       Physical Exam  Vitals and nursing note reviewed. Constitutional:       General: He is not in acute distress. Musculoskeletal:         General: No swelling, tenderness, deformity or signs of injury. Skin:     General: Skin is warm and dry. Findings: No erythema or rash. Neurological:      Mental Status: He is alert and oriented to person, place, and time. Comments: Does have weakness to his right side which is chronic. Left upper extremity with good strength and range of motion. Sensory light touch was good. Neurovascularly intact. Psychiatric:         Mood and Affect: Mood normal.         Behavior: Behavior normal.         Thought Content: Thought content normal.         Judgment: Judgment normal.                 Assessment and Plan:  Ebonie Issa was seen today for neck pain. Diagnoses and all orders for this visit:    Neck pain    Cervical radiculopathy    Other orders  -     predniSONE (DELTASONE) 10 MG tablet; Take 3 tablets by mouth daily for 3 days, THEN 2 tablets daily for 3 days, THEN 1 tablet daily for 3 days. Plan: We will give prednisone taper dose. Recommended cold over the weekend and then heat to the area. Did not feel x-ray was indicated at this time. Follow with PCP. Notify us if not improving. Return for fu pcp. Seen By:  Khadijah Del Angel MD      *Document was created using voice recognition software. Note was reviewed however may contain grammatical errors.

## 2022-06-25 ENCOUNTER — HOSPITAL ENCOUNTER (EMERGENCY)
Age: 67
Discharge: HOME | End: 2022-06-25
Payer: MEDICARE

## 2022-06-25 ENCOUNTER — APPOINTMENT (OUTPATIENT)
Dept: CT IMAGING | Age: 67
End: 2022-06-25
Payer: MEDICARE

## 2022-06-25 ENCOUNTER — APPOINTMENT (OUTPATIENT)
Dept: GENERAL RADIOLOGY | Age: 67
End: 2022-06-25
Payer: MEDICARE

## 2022-06-25 VITALS
HEIGHT: 72 IN | RESPIRATION RATE: 15 BRPM | TEMPERATURE: 98 F | OXYGEN SATURATION: 95 % | HEART RATE: 71 BPM | WEIGHT: 260 LBS | SYSTOLIC BLOOD PRESSURE: 123 MMHG | BODY MASS INDEX: 35.21 KG/M2 | DIASTOLIC BLOOD PRESSURE: 67 MMHG

## 2022-06-25 DIAGNOSIS — M54.50 ACUTE RIGHT-SIDED LOW BACK PAIN WITHOUT SCIATICA: ICD-10-CM

## 2022-06-25 DIAGNOSIS — M53.9 MULTILEVEL DEGENERATIVE DISC DISEASE: ICD-10-CM

## 2022-06-25 DIAGNOSIS — M25.511 ACUTE PAIN OF RIGHT SHOULDER: ICD-10-CM
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
## 2022-06-25 PROCEDURE — 99284 EMERGENCY DEPT VISIT MOD MDM: CPT

## 2022-06-25 PROCEDURE — 72131 CT LUMBAR SPINE W/O DYE: CPT

## 2022-06-25 PROCEDURE — 72128 CT CHEST SPINE W/O DYE: CPT

## 2022-06-25 PROCEDURE — 72125 CT NECK SPINE W/O DYE: CPT

## 2022-06-25 PROCEDURE — 70450 CT HEAD/BRAIN W/O DYE: CPT

## 2022-06-25 PROCEDURE — 73060 X-RAY EXAM OF HUMERUS: CPT

## 2022-06-25 PROCEDURE — 73030 X-RAY EXAM OF SHOULDER: CPT
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
## 2023-03-19 ENCOUNTER — APPOINTMENT (OUTPATIENT)
Dept: GENERAL RADIOLOGY | Age: 68
End: 2023-03-19
Payer: MEDICARE

## 2023-03-19 ENCOUNTER — HOSPITAL ENCOUNTER (EMERGENCY)
Age: 68
Discharge: HOME | End: 2023-03-20
Attending: EMERGENCY MEDICINE
Payer: MEDICARE

## 2023-03-19 ENCOUNTER — APPOINTMENT (OUTPATIENT)
Dept: CT IMAGING | Age: 68
End: 2023-03-19
Payer: MEDICARE

## 2023-03-19 ENCOUNTER — APPOINTMENT (OUTPATIENT)
Dept: ULTRASOUND IMAGING | Age: 68
End: 2023-03-19
Payer: MEDICARE

## 2023-03-19 DIAGNOSIS — K80.20 CALCULUS OF GALLBLADDER WITHOUT CHOLECYSTITIS WITHOUT OBSTRUCTION: ICD-10-CM

## 2023-03-19 DIAGNOSIS — M54.6 ACUTE LEFT-SIDED THORACIC BACK PAIN: Primary | ICD-10-CM

## 2023-03-19 LAB
ALBUMIN SERPL-MCNC: 3.8 G/DL (ref 3.5–5.2)
ALP SERPL-CCNC: 125 U/L (ref 40–129)
ALT SERPL-CCNC: 34 U/L (ref 0–40)
ANION GAP SERPL CALCULATED.3IONS-SCNC: 7 MMOL/L (ref 7–16)
AST SERPL-CCNC: 36 U/L (ref 0–39)
BASOPHILS # BLD: 0.04 E9/L (ref 0–0.2)
BASOPHILS NFR BLD: 0.8 % (ref 0–2)
BILIRUB SERPL-MCNC: 0.4 MG/DL (ref 0–1.2)
BILIRUB UR QL STRIP: NEGATIVE
BUN SERPL-MCNC: 27 MG/DL (ref 6–23)
CALCIUM SERPL-MCNC: 9 MG/DL (ref 8.6–10.2)
CHLORIDE SERPL-SCNC: 106 MMOL/L (ref 98–107)
CLARITY UR: CLEAR
CO2 SERPL-SCNC: 26 MMOL/L (ref 22–29)
COLOR UR: YELLOW
CREAT SERPL-MCNC: 1.2 MG/DL (ref 0.7–1.2)
D DIMER: <200 NG/ML DDU
EOSINOPHIL # BLD: 0.2 E9/L (ref 0.05–0.5)
EOSINOPHIL NFR BLD: 4 % (ref 0–6)
ERYTHROCYTE [DISTWIDTH] IN BLOOD BY AUTOMATED COUNT: 15 FL (ref 11.5–15)
GLUCOSE SERPL-MCNC: 135 MG/DL (ref 74–99)
GLUCOSE UR STRIP-MCNC: NEGATIVE MG/DL
HCT VFR BLD AUTO: 39.2 % (ref 37–54)
HGB BLD-MCNC: 12.8 G/DL (ref 12.5–16.5)
HGB UR QL STRIP: NEGATIVE
IMM GRANULOCYTES # BLD: 0.03 E9/L
IMM GRANULOCYTES NFR BLD: 0.6 % (ref 0–5)
KETONES UR STRIP-MCNC: ABNORMAL MG/DL
LEUKOCYTE ESTERASE UR QL STRIP: NEGATIVE
LIPASE: 54 U/L (ref 13–60)
LYMPHOCYTES # BLD: 1.11 E9/L (ref 1.5–4)
LYMPHOCYTES NFR BLD: 22.1 % (ref 20–42)
MCH RBC QN AUTO: 31.3 PG (ref 26–35)
MCHC RBC AUTO-ENTMCNC: 32.7 % (ref 32–34.5)
MCV RBC AUTO: 95.8 FL (ref 80–99.9)
MONOCYTES # BLD: 0.5 E9/L (ref 0.1–0.95)
MONOCYTES NFR BLD: 9.9 % (ref 2–12)
NEUTROPHILS # BLD: 3.15 E9/L (ref 1.8–7.3)
NEUTS SEG NFR BLD: 62.6 % (ref 43–80)
NITRITE UR QL STRIP: NEGATIVE
PH UR STRIP: 5.5 [PH] (ref 5–9)
PLATELET # BLD AUTO: 128 E9/L (ref 130–450)
PMV BLD AUTO: 9.5 FL (ref 7–12)
POTASSIUM SERPL-SCNC: 4.1 MMOL/L (ref 3.5–5)
PROT SERPL-MCNC: 6.6 G/DL (ref 6.4–8.3)
PROT UR STRIP-MCNC: NEGATIVE MG/DL
RBC # BLD AUTO: 4.09 E12/L (ref 3.8–5.8)
SODIUM SERPL-SCNC: 139 MMOL/L (ref 132–146)
SP GR UR STRIP: 1.02 (ref 1–1.03)
TROPONIN, HIGH SENSITIVITY: 11 NG/L (ref 0–11)
TROPONIN, HIGH SENSITIVITY: 12 NG/L (ref 0–11)
UROBILINOGEN UR STRIP-ACNC: 0.2 E.U./DL
WBC # BLD: 5 E9/L (ref 4.5–11.5)

## 2023-03-19 PROCEDURE — 72128 CT CHEST SPINE W/O DYE: CPT

## 2023-03-19 PROCEDURE — 80053 COMPREHEN METABOLIC PANEL: CPT

## 2023-03-19 PROCEDURE — 6360000002 HC RX W HCPCS: Performed by: EMERGENCY MEDICINE

## 2023-03-19 PROCEDURE — 85025 COMPLETE CBC W/AUTO DIFF WBC: CPT

## 2023-03-19 PROCEDURE — 96374 THER/PROPH/DIAG INJ IV PUSH: CPT

## 2023-03-19 PROCEDURE — 76705 ECHO EXAM OF ABDOMEN: CPT

## 2023-03-19 PROCEDURE — 71045 X-RAY EXAM CHEST 1 VIEW: CPT

## 2023-03-19 PROCEDURE — 36415 COLL VENOUS BLD VENIPUNCTURE: CPT

## 2023-03-19 PROCEDURE — 81003 URINALYSIS AUTO W/O SCOPE: CPT

## 2023-03-19 PROCEDURE — 84484 ASSAY OF TROPONIN QUANT: CPT

## 2023-03-19 PROCEDURE — 99285 EMERGENCY DEPT VISIT HI MDM: CPT

## 2023-03-19 PROCEDURE — 93005 ELECTROCARDIOGRAM TRACING: CPT | Performed by: EMERGENCY MEDICINE

## 2023-03-19 PROCEDURE — 96372 THER/PROPH/DIAG INJ SC/IM: CPT

## 2023-03-19 PROCEDURE — 83690 ASSAY OF LIPASE: CPT

## 2023-03-19 PROCEDURE — 85378 FIBRIN DEGRADE SEMIQUANT: CPT

## 2023-03-19 RX ORDER — ORPHENADRINE CITRATE 30 MG/ML
60 INJECTION INTRAMUSCULAR; INTRAVENOUS ONCE
Status: COMPLETED | OUTPATIENT
Start: 2023-03-19 | End: 2023-03-19

## 2023-03-19 RX ORDER — METHYLPREDNISOLONE SODIUM SUCCINATE 40 MG/ML
40 INJECTION, POWDER, LYOPHILIZED, FOR SOLUTION INTRAMUSCULAR; INTRAVENOUS ONCE
Status: COMPLETED | OUTPATIENT
Start: 2023-03-19 | End: 2023-03-19

## 2023-03-19 RX ADMIN — METHYLPREDNISOLONE SODIUM SUCCINATE 40 MG: 40 INJECTION, POWDER, FOR SOLUTION INTRAMUSCULAR; INTRAVENOUS at 19:51

## 2023-03-19 RX ADMIN — ORPHENADRINE CITRATE 60 MG: 30 INJECTION INTRAMUSCULAR; INTRAVENOUS at 18:36

## 2023-03-19 ASSESSMENT — LIFESTYLE VARIABLES
HOW OFTEN DO YOU HAVE A DRINK CONTAINING ALCOHOL: NEVER
HOW MANY STANDARD DRINKS CONTAINING ALCOHOL DO YOU HAVE ON A TYPICAL DAY: PATIENT DOES NOT DRINK

## 2023-03-19 ASSESSMENT — PAIN - FUNCTIONAL ASSESSMENT: PAIN_FUNCTIONAL_ASSESSMENT: 0-10

## 2023-03-19 ASSESSMENT — PAIN SCALES - GENERAL
PAINLEVEL_OUTOF10: 4
PAINLEVEL_OUTOF10: 7

## 2023-03-19 ASSESSMENT — PAIN DESCRIPTION - LOCATION: LOCATION: BACK

## 2023-03-19 NOTE — ED PROVIDER NOTES
Medications    ALLOPURINOL (ZYLOPRIM) 100 MG TABLET    Take 200 mg by mouth daily    AMLODIPINE (NORVASC) 5 MG TABLET    Take 1 tablet by mouth daily    ASPIRIN 81 MG TABLET    Take 81 mg by mouth nightly     ATORVASTATIN (LIPITOR) 40 MG TABLET    Take 1 tablet by mouth nightly    BACLOFEN, PAIN PUMP REFILL CHARGE,    2.83 mcg by Implant route continuous     CALCIUM CARBONATE-VITAMIN D (OYSTER SHELL CALCIUM/D) 500-200 MG-UNIT TABS    Take 1 tablet by mouth 2 times daily     CITALOPRAM (CELEXA) 20 MG TABLET    Take 1 tablet by mouth daily    GABAPENTIN (NEURONTIN) 100 MG CAPSULE    Take 100 mg by mouth 2 times daily    ISOSORBIDE MONONITRATE (IMDUR) 60 MG EXTENDED RELEASE TABLET    Take 1 tablet by mouth daily    LEVOTHYROXINE (SYNTHROID) 150 MCG TABLET    Take 1 tablet by mouth Daily    MAGNESIUM OXIDE PO    Take 40 mg by mouth daily     METOPROLOL SUCCINATE (TOPROL XL) 25 MG EXTENDED RELEASE TABLET    Take 1 tablet by mouth daily    MISC NATURAL PRODUCTS (OSTEO BI-FLEX JOINT SHIELD PO)    Take 1 tablet by mouth daily     NITROGLYCERIN (NITROSTAT) 0.4 MG SL TABLET    up to max of 3 total doses. If no relief after 1 dose, call 911.     POTASSIUM CITRATE (UROCIT-K) 10 MEQ (1080 MG) EXTENDED RELEASE TABLET    Take 10 mEq by mouth 2 times daily     TAMSULOSIN (FLOMAX) 0.4 MG CAPSULE    Take 0.4 mg by mouth nightly     THERAPEUTIC MULTIVITAMIN-MINERALS (THERAGRAN-M) TABLET    Take 1 tablet by mouth daily        ALLERGIES     Septra [bactrim] and Sulfamethoxazole-trimethoprim    FAMILYHISTORY       Family History   Problem Relation Age of Onset    COPD Mother     Diabetes Mother     Other Father         Black lung    Cancer Brother         Lung, Stomach        SOCIAL HISTORY       Social History     Tobacco Use    Smoking status: Never    Smokeless tobacco: Never   Vaping Use    Vaping Use: Never used   Substance Use Topics    Alcohol use: No    Drug use: Never       SCREENINGS        [place] Coma Scale  Eye Opening:

## 2023-03-20 VITALS
HEART RATE: 64 BPM | RESPIRATION RATE: 15 BRPM | SYSTOLIC BLOOD PRESSURE: 140 MMHG | DIASTOLIC BLOOD PRESSURE: 65 MMHG | HEIGHT: 72 IN | WEIGHT: 260 LBS | OXYGEN SATURATION: 97 % | TEMPERATURE: 97.7 F | BODY MASS INDEX: 35.21 KG/M2

## 2023-03-20 RX ORDER — ORPHENADRINE CITRATE 100 MG/1
100 TABLET, EXTENDED RELEASE ORAL 2 TIMES DAILY
Qty: 20 TABLET | Refills: 0 | Status: SHIPPED | OUTPATIENT
Start: 2023-03-20 | End: 2023-03-30

## 2023-03-20 NOTE — ED PROVIDER NOTES
--------------------------------- IMPRESSION AND DISPOSITION ---------------------------------    IMPRESSION  1. Acute left-sided thoracic back pain    2.  Calculus of gallbladder without cholecystitis without obstruction        DISPOSITION  Disposition: Discharge to home  Patient condition is stable         Tito Cota DO  03/21/23 5810

## 2023-03-21 LAB
EKG ATRIAL RATE: 62 BPM
EKG P AXIS: 19 DEGREES
EKG P-R INTERVAL: 188 MS
EKG Q-T INTERVAL: 428 MS
EKG QRS DURATION: 84 MS
EKG QTC CALCULATION (BAZETT): 434 MS
EKG R AXIS: -23 DEGREES
EKG T AXIS: 98 DEGREES
EKG VENTRICULAR RATE: 62 BPM

## 2023-03-21 PROCEDURE — 93010 ELECTROCARDIOGRAM REPORT: CPT | Performed by: INTERNAL MEDICINE

## 2023-12-05 ENCOUNTER — NURSE TRIAGE (OUTPATIENT)
Dept: OTHER | Facility: CLINIC | Age: 68
End: 2023-12-05

## 2023-12-05 NOTE — TELEPHONE ENCOUNTER
Location of patient: [place]    Triage completed with spouse of patient, reports patient has paralysis and speech problems    Received call from [facility] at [facility]; Patient with Red Flag Complaint requesting to establish care with unspecified. Subjective: Caller states needing to get established with continued occupational health covered PCP for continued workers compensation process for long-term injury, as current provider is leaving practice within one month, spouse of patient denies need for acute visit    Current Symptoms:   -workers comp since 2008  -TBI  -right sided paralysis  -wheelchair confiment  -speech problems    Onset: 15 years ago; unchanged    Temperature: denies     What has been tried:   -home OT/PT    Recommended disposition:  establish care    Care advice provided, patient verbalizes understanding; denies any other questions or concerns; instructed to call back for any new or worsening symptoms. Patient/Caller agrees with recommended disposition; writer provided warm transfer to [employer] at [facility] for appointment scheduling    Attention Provider: Thank you for allowing me to participate in the care of your patient. The patient was connected to triage in response to information provided to the [employer]. Please do not respond through this encounter as the response is not directed to a shared pool. Reason for Disposition   Requesting regular office appointment and adult stable (no new symptoms, not worsening)    Protocols used:  Information Only Call - No Triage-ADULT-[place]

## 2024-06-28 LAB
ANION GAP SERPL CALCULATED.3IONS-SCNC: 9 MMOL/L (ref 7–16)
BUN SERPL-MCNC: 24 MG/DL (ref 6–23)
CALCIUM SERPL-MCNC: 9.7 MG/DL (ref 8.6–10.2)
CHLORIDE SERPL-SCNC: 106 MMOL/L (ref 98–107)
CO2 SERPL-SCNC: 27 MMOL/L (ref 22–29)
CREAT SERPL-MCNC: 1.2 MG/DL (ref 0.7–1.2)
ERYTHROCYTE [DISTWIDTH] IN BLOOD BY AUTOMATED COUNT: 15.9 % (ref 11.5–15)
GFR, ESTIMATED: 64 ML/MIN/1.73M2
GLUCOSE SERPL-MCNC: 102 MG/DL (ref 74–99)
HCT VFR BLD AUTO: 46.4 % (ref 37–54)
HGB BLD-MCNC: 14.2 G/DL (ref 12.5–16.5)
MCH RBC QN AUTO: 30.5 PG (ref 26–35)
MCHC RBC AUTO-ENTMCNC: 30.6 G/DL (ref 32–34.5)
MCV RBC AUTO: 99.6 FL (ref 80–99.9)
PLATELET # BLD AUTO: 157 K/UL (ref 130–450)
PMV BLD AUTO: 9.7 FL (ref 7–12)
POTASSIUM SERPL-SCNC: 5.2 MMOL/L (ref 3.5–5)
RBC # BLD AUTO: 4.66 M/UL (ref 3.8–5.8)
SODIUM SERPL-SCNC: 142 MMOL/L (ref 132–146)
WBC OTHER # BLD: 4.7 K/UL (ref 4.5–11.5)

## 2025-02-17 ENCOUNTER — APPOINTMENT (OUTPATIENT)
Dept: GENERAL RADIOLOGY | Age: 70
End: 2025-02-17
Payer: MEDICARE

## 2025-02-17 ENCOUNTER — HOSPITAL ENCOUNTER (EMERGENCY)
Age: 70
Discharge: HOME | End: 2025-02-17
Attending: EMERGENCY MEDICINE
Payer: MEDICARE

## 2025-02-17 VITALS
DIASTOLIC BLOOD PRESSURE: 71 MMHG | HEART RATE: 92 BPM | SYSTOLIC BLOOD PRESSURE: 122 MMHG | OXYGEN SATURATION: 97 % | TEMPERATURE: 100.4 F | RESPIRATION RATE: 20 BRPM

## 2025-02-17 DIAGNOSIS — U07.1 COVID: Primary | ICD-10-CM

## 2025-02-17 LAB
INFLUENZA A BY PCR: NOT DETECTED
INFLUENZA B BY PCR: NOT DETECTED
RSV BY PCR: NOT DETECTED
SARS-COV-2 RDRP RESP QL NAA+PROBE: DETECTED
SPECIMEN DESCRIPTION: ABNORMAL
SPECIMEN SOURCE: NORMAL

## 2025-02-17 PROCEDURE — 99285 EMERGENCY DEPT VISIT HI MDM: CPT

## 2025-02-17 PROCEDURE — 71045 X-RAY EXAM CHEST 1 VIEW: CPT

## 2025-02-17 PROCEDURE — 87634 RSV DNA/RNA AMP PROBE: CPT

## 2025-02-17 PROCEDURE — 6370000000 HC RX 637 (ALT 250 FOR IP): Performed by: PHYSICIAN ASSISTANT

## 2025-02-17 PROCEDURE — 93005 ELECTROCARDIOGRAM TRACING: CPT

## 2025-02-17 PROCEDURE — 87635 SARS-COV-2 COVID-19 AMP PRB: CPT

## 2025-02-17 PROCEDURE — 87502 INFLUENZA DNA AMP PROBE: CPT

## 2025-02-17 RX ORDER — ACETAMINOPHEN 500 MG
1000 TABLET ORAL ONCE
Status: COMPLETED | OUTPATIENT
Start: 2025-02-17 | End: 2025-02-17

## 2025-02-17 RX ADMIN — ACETAMINOPHEN 1000 MG: 500 TABLET ORAL at 16:07

## 2025-02-17 NOTE — ED TRIAGE NOTES
Department of Emergency Medicine  FIRST PROVIDER TRIAGE NOTE             [facility]           2/17/25  1:35 PM EST    Date of Encounter: 2/17/25   MRN: [id number]      HPI: Sriram Velazquez is a 70 y.o. male who presents to the ED for Shortness of Breath (Started the other day. 95% at pivot, )       ROS: Negative for dizziness, Suicidal ideation, or Homicidal Ideation.    PE: Gen Appearance/Constitutional: alert  CV: regular rate  Pulm: CTA bilat     Initial Plan of Care: All treatment areas with department are currently occupied. Plan to order/Initiate the following while awaiting opening in ED: EKG, imaging studies, COVID-19 testing, and influenza testing.  Initiate Treatment-Testing, Proceed toTreatment Area When Bed Available for ED Attending/MLP to Continue Care    Electronically signed by LIZET Webber - LINSEY   DD: 2/17/25

## 2025-02-17 NOTE — ED PROVIDER NOTES
[facility] Visit.             [facility] EMERGENCY DEPARTMENT  ED  Encounter Note  Admit Date/RoomTime: 2025  3:42 PM  ED Room: DISPO/D02  NAME: Sriram Velazquez  : 1955  MRN: [id number]  PCP: Ayden Zendejas DO    CHIEF COMPLAINT     Shortness of Breath (Started the other day. 95% at pivot, )    HISTORY OF PRESENT ILLNESS        Sriram Velazquez is a 70 y.o. male who presents to the ED by private vehicle for SOB, cough and fever, beginning 1 day(s) ago. The complaint has been persistent and are mild in severity.  Patient has hx of traumatic work injury with right hemiparesis.   He is in battery W/C.   Comes in reporting new onset body aches, fever, cough, congestion and mild SOB.   No hx of CHF.   He did have several stents placed a year ago.   Denies CP or edema.   Wife is not currently ill.   No known exposure.   Thinks that he did get the flu shot this year.  Has not taken anything for fever.      REVIEW OF SYSTEMS     Pertinent positives and negatives are stated within HPI, all other systems reviewed and are negative.    Past Medical History:  has a past medical history of Bleeding tendency (HCC), Diabetes mellitus (HCC), DVT (deep venous thrombosis) (HCC), Gout, Hydrocephalus (HCC), Hypertension, Hypothyroidism, Intracranial bleed (HCC), Left knee pain, Pain of right side of body, Tingling, Traumatic brain injury, Urination decrease, Visual disturbances, and Weakness of right side of body.  Surgical History:  has a past surgical history that includes brain surgery; Kidney stone surgery; fracture surgery; joint replacement; hip surgery; partial nephrectomy; baclofen pump implantation (2015); and Ventriculoperitoneal shunt ().  Social History:  reports that he has never smoked. He has never used smokeless tobacco. He reports that he does not drink alcohol and does not use drugs.  Family History: family history includes COPD in his mother; Cancer

## 2025-02-18 LAB
EKG ATRIAL RATE: 94 BPM
EKG P AXIS: 33 DEGREES
EKG P-R INTERVAL: 156 MS
EKG Q-T INTERVAL: 344 MS
EKG QRS DURATION: 76 MS
EKG QTC CALCULATION (BAZETT): 430 MS
EKG R AXIS: 5 DEGREES
EKG T AXIS: 84 DEGREES
EKG VENTRICULAR RATE: 94 BPM

## 2025-02-18 PROCEDURE — 93010 ELECTROCARDIOGRAM REPORT: CPT | Performed by: INTERNAL MEDICINE
